# Patient Record
Sex: FEMALE | Race: WHITE | NOT HISPANIC OR LATINO | URBAN - METROPOLITAN AREA
[De-identification: names, ages, dates, MRNs, and addresses within clinical notes are randomized per-mention and may not be internally consistent; named-entity substitution may affect disease eponyms.]

---

## 2018-01-04 ENCOUNTER — EMERGENCY (EMERGENCY)
Facility: HOSPITAL | Age: 28
LOS: 0 days | Discharge: HOME | End: 2018-01-04
Attending: EMERGENCY MEDICINE
Payer: COMMERCIAL

## 2018-01-04 VITALS
TEMPERATURE: 99 F | DIASTOLIC BLOOD PRESSURE: 91 MMHG | OXYGEN SATURATION: 100 % | SYSTOLIC BLOOD PRESSURE: 123 MMHG | RESPIRATION RATE: 17 BRPM | HEIGHT: 62 IN | HEART RATE: 96 BPM | WEIGHT: 139.99 LBS

## 2018-01-04 VITALS
OXYGEN SATURATION: 100 % | DIASTOLIC BLOOD PRESSURE: 85 MMHG | HEART RATE: 91 BPM | TEMPERATURE: 98 F | SYSTOLIC BLOOD PRESSURE: 119 MMHG | RESPIRATION RATE: 16 BRPM

## 2018-01-04 DIAGNOSIS — R30.0 DYSURIA: ICD-10-CM

## 2018-01-04 DIAGNOSIS — R10.9 UNSPECIFIED ABDOMINAL PAIN: ICD-10-CM

## 2018-01-04 DIAGNOSIS — R35.0 FREQUENCY OF MICTURITION: ICD-10-CM

## 2018-01-04 DIAGNOSIS — N39.0 URINARY TRACT INFECTION, SITE NOT SPECIFIED: ICD-10-CM

## 2018-01-04 LAB
APPEARANCE UR: CLEAR — SIGNIFICANT CHANGE UP
BACTERIA # UR AUTO: ABNORMAL
BILIRUB UR-MCNC: NEGATIVE — SIGNIFICANT CHANGE UP
COLOR SPEC: YELLOW — SIGNIFICANT CHANGE UP
DIFF PNL FLD: ABNORMAL
EPI CELLS # UR: SIGNIFICANT CHANGE UP
GLUCOSE UR QL: NEGATIVE MG/DL — SIGNIFICANT CHANGE UP
HCG UR QL: NEGATIVE — SIGNIFICANT CHANGE UP
KETONES UR-MCNC: NEGATIVE — SIGNIFICANT CHANGE UP
LEUKOCYTE ESTERASE UR-ACNC: ABNORMAL
NITRITE UR-MCNC: NEGATIVE — SIGNIFICANT CHANGE UP
PH UR: 6 — SIGNIFICANT CHANGE UP (ref 5–8)
PROT UR-MCNC: 100 MG/DL
SP GR SPEC: 1.01 — SIGNIFICANT CHANGE UP (ref 1.01–1.02)
UROBILINOGEN FLD QL: NEGATIVE MG/DL — SIGNIFICANT CHANGE UP
WBC UR QL: ABNORMAL

## 2018-01-04 PROCEDURE — 99284 EMERGENCY DEPT VISIT MOD MDM: CPT | Mod: 25

## 2018-01-04 RX ORDER — PHENAZOPYRIDINE HCL 100 MG
200 TABLET ORAL ONCE
Qty: 0 | Refills: 0 | Status: COMPLETED | OUTPATIENT
Start: 2018-01-04 | End: 2018-01-04

## 2018-01-04 RX ORDER — NITROFURANTOIN MACROCRYSTAL 50 MG
1 CAPSULE ORAL
Qty: 20 | Refills: 0
Start: 2018-01-04 | End: 2018-01-13

## 2018-01-04 RX ORDER — NITROFURANTOIN MACROCRYSTAL 50 MG
100 CAPSULE ORAL ONCE
Qty: 0 | Refills: 0 | Status: COMPLETED | OUTPATIENT
Start: 2018-01-04 | End: 2018-01-04

## 2018-01-04 RX ORDER — PHENAZOPYRIDINE HCL 100 MG
1 TABLET ORAL
Qty: 6 | Refills: 0
Start: 2018-01-04 | End: 2018-01-05

## 2018-01-04 RX ADMIN — Medication 100 MILLIGRAM(S): at 02:37

## 2018-01-04 RX ADMIN — Medication 200 MILLIGRAM(S): at 02:11

## 2018-01-04 NOTE — ED PROVIDER NOTE - OBJECTIVE STATEMENT
27yoF; with no signif pmh; now p/w dysuria, frequency, urgency, hematuria and suprapubic abd pain. denies n/v/d. denies vaginal discharge. denies f/c/s. denies sick contacts. denies travel.  PMH: denies  SOCIAL: No EtOH/tobacco/illicit substance use  ROS: denies fever, chills, sweats; denies visual changes or eye pain or discharge; denies sore throat, rhinorrhea, tinnitus, ear pain, hearing changes; +abd pain, denies n/v/d; denies cp/palp; denies sob/cough/sputum production; denies back pain, neck pain, muscle aches; + dysuria, hematuria, frequency, urgency; denies headache; denies numbness/tingling/weakness; denies changes in neurologic status/function; denies rashes

## 2018-01-04 NOTE — ED ADULT NURSE NOTE - OBJECTIVE STATEMENT
Pt is an A&OX4 27 YOF who is here for symptoms of a urinary tract infection. Pt states that she has had painful urination, urinary frequency and smelly urine. Pt states that over the last day or two, she has developed these symptoms, which at times have caused her pain that has prevented her from getting comfortable. Pt has no abdominal pain on palpation, + bowel sounds x 4 quadrants and shows no signs of trauma to the area. Pt has a hx of UTI and bladder infection and states that this feels like one of the two. Pt denies any fever, chills.

## 2018-01-09 ENCOUNTER — APPOINTMENT (OUTPATIENT)
Dept: ENDOCRINOLOGY | Facility: CLINIC | Age: 28
End: 2018-01-09
Payer: MEDICAID

## 2018-01-09 VITALS
HEART RATE: 104 BPM | DIASTOLIC BLOOD PRESSURE: 80 MMHG | OXYGEN SATURATION: 98 % | HEIGHT: 62.5 IN | SYSTOLIC BLOOD PRESSURE: 120 MMHG | WEIGHT: 141 LBS | BODY MASS INDEX: 25.3 KG/M2

## 2018-01-09 DIAGNOSIS — F17.210 NICOTINE DEPENDENCE, CIGARETTES, UNCOMPLICATED: ICD-10-CM

## 2018-01-09 DIAGNOSIS — Z81.8 FAMILY HISTORY OF OTHER MENTAL AND BEHAVIORAL DISORDERS: ICD-10-CM

## 2018-01-09 DIAGNOSIS — Z82.49 FAMILY HISTORY OF ISCHEMIC HEART DISEASE AND OTHER DISEASES OF THE CIRCULATORY SYSTEM: ICD-10-CM

## 2018-01-09 DIAGNOSIS — Z78.9 OTHER SPECIFIED HEALTH STATUS: ICD-10-CM

## 2018-01-09 PROCEDURE — 99204 OFFICE O/P NEW MOD 45 MIN: CPT

## 2018-01-09 RX ORDER — VALACYCLOVIR HYDROCHLORIDE 1 G/1
TABLET, FILM COATED ORAL
Refills: 0 | Status: ACTIVE | COMMUNITY

## 2018-01-09 RX ORDER — ALPRAZOLAM 2 MG/1
TABLET ORAL
Refills: 0 | Status: ACTIVE | COMMUNITY

## 2018-01-16 ENCOUNTER — RESULT REVIEW (OUTPATIENT)
Age: 28
End: 2018-01-16

## 2018-01-16 LAB
ESTRADIOL SERPL-MCNC: 30 PG/ML
FSH SERPL-MCNC: 6.3 IU/L
HBA1C MFR BLD HPLC: 4.9 %
HCG SERPL-MCNC: <1 MIU/ML
LH SERPL-ACNC: 14.2 IU/L
PROGEST SERPL-MCNC: 1 NG/ML
PROLACTIN SERPL-MCNC: 22.8 NG/ML
T4 FREE SERPL-MCNC: 1.2 NG/DL
TSH SERPL-ACNC: 2.25 UIU/ML

## 2018-01-18 LAB
17OHP SERPL-MCNC: 2171 NG/DL
ANDROST SERPL-MCNC: 760 NG/DL
MONOMERIC PROLACTIN (ICMA)*: 22 NG/ML
PERCENT MACROPROLACTIN: 0 %
PROLACTIN, SERUM (ICMA)*: 22 NG/ML
TESTOST BND SERPL-MCNC: 19 PG/ML
TESTOST SERPL-MCNC: 72.6 NG/DL

## 2018-08-21 ENCOUNTER — APPOINTMENT (OUTPATIENT)
Dept: DERMATOLOGY | Facility: CLINIC | Age: 28
End: 2018-08-21

## 2019-05-03 ENCOUNTER — RECORD ABSTRACTING (OUTPATIENT)
Age: 29
End: 2019-05-03

## 2019-05-03 ENCOUNTER — NON-APPOINTMENT (OUTPATIENT)
Age: 29
End: 2019-05-03

## 2019-05-03 ENCOUNTER — APPOINTMENT (OUTPATIENT)
Dept: INTERNAL MEDICINE | Facility: CLINIC | Age: 29
End: 2019-05-03
Payer: MEDICAID

## 2019-05-03 VITALS
WEIGHT: 138 LBS | TEMPERATURE: 98.6 F | HEART RATE: 69 BPM | BODY MASS INDEX: 25.4 KG/M2 | HEIGHT: 62 IN | SYSTOLIC BLOOD PRESSURE: 111 MMHG | RESPIRATION RATE: 95 BRPM | DIASTOLIC BLOOD PRESSURE: 71 MMHG

## 2019-05-03 DIAGNOSIS — Z86.19 PERSONAL HISTORY OF OTHER INFECTIOUS AND PARASITIC DISEASES: ICD-10-CM

## 2019-05-03 DIAGNOSIS — Z00.00 ENCOUNTER FOR GENERAL ADULT MEDICAL EXAMINATION W/OUT ABNORMAL FINDINGS: ICD-10-CM

## 2019-05-03 LAB
BILIRUB UR QL STRIP: NEGATIVE
GLUCOSE UR-MCNC: NEGATIVE
HCG UR QL: 0.2 EU/DL
HGB UR QL STRIP.AUTO: NEGATIVE
KETONES UR-MCNC: NEGATIVE
LEUKOCYTE ESTERASE UR QL STRIP: NORMAL
NITRITE UR QL STRIP: NEGATIVE
PH UR STRIP: 7.5
PROT UR STRIP-MCNC: NEGATIVE
SP GR UR STRIP: 1.01

## 2019-05-03 PROCEDURE — 99395 PREV VISIT EST AGE 18-39: CPT | Mod: 25

## 2019-05-03 PROCEDURE — 36415 COLL VENOUS BLD VENIPUNCTURE: CPT

## 2019-05-03 PROCEDURE — 93000 ELECTROCARDIOGRAM COMPLETE: CPT

## 2019-05-03 PROCEDURE — 81003 URINALYSIS AUTO W/O SCOPE: CPT | Mod: QW

## 2019-05-03 RX ORDER — NITROFURANTOIN (MONOHYDRATE/MACROCRYSTALS) 25; 75 MG/1; MG/1
CAPSULE ORAL
Refills: 0 | Status: DISCONTINUED | COMMUNITY
End: 2019-05-03

## 2019-05-03 NOTE — PHYSICAL EXAM
[No Acute Distress] : no acute distress [Well Nourished] : well nourished [Normal Sclera/Conjunctiva] : normal sclera/conjunctiva [Well Developed] : well developed [Well-Appearing] : well-appearing [EOMI] : extraocular movements intact [Normal Outer Ear/Nose] : the outer ears and nose were normal in appearance [PERRL] : pupils equal round and reactive to light [Normal Oropharynx] : the oropharynx was normal [Supple] : supple [No JVD] : no jugular venous distention [No Lymphadenopathy] : no lymphadenopathy [Thyroid Normal, No Nodules] : the thyroid was normal and there were no nodules present [No Respiratory Distress] : no respiratory distress  [Normal Rate] : normal rate  [Clear to Auscultation] : lungs were clear to auscultation bilaterally [No Accessory Muscle Use] : no accessory muscle use [Normal S1, S2] : normal S1 and S2 [Regular Rhythm] : with a regular rhythm [No Abdominal Bruit] : a ~M bruit was not heard ~T in the abdomen [No Murmur] : no murmur heard [No Carotid Bruits] : no carotid bruits [No Edema] : there was no peripheral edema [Pedal Pulses Present] : the pedal pulses are present [No Varicosities] : no varicosities [Soft] : abdomen soft [No Palpable Aorta] : no palpable aorta [No Extremity Clubbing/Cyanosis] : no extremity clubbing/cyanosis [Non Tender] : non-tender [Non-distended] : non-distended [No Masses] : no abdominal mass palpated [Normal Bowel Sounds] : normal bowel sounds [Normal Posterior Cervical Nodes] : no posterior cervical lymphadenopathy [No HSM] : no HSM [No CVA Tenderness] : no CVA  tenderness [No Spinal Tenderness] : no spinal tenderness [Normal Anterior Cervical Nodes] : no anterior cervical lymphadenopathy [No Rash] : no rash [Grossly Normal Strength/Tone] : grossly normal strength/tone [No Joint Swelling] : no joint swelling [Normal Gait] : normal gait [No Focal Deficits] : no focal deficits [Coordination Grossly Intact] : coordination grossly intact [Deep Tendon Reflexes (DTR)] : deep tendon reflexes were 2+ and symmetric [Normal Insight/Judgement] : insight and judgment were intact [Normal Affect] : the affect was normal

## 2019-05-03 NOTE — HEALTH RISK ASSESSMENT
[Excellent] : ~his/her~  mood as  excellent [Student] : student [College] : College [] : No [de-identified] : good

## 2019-05-03 NOTE — HISTORY OF PRESENT ILLNESS
[FreeTextEntry1] : For comprehensive [de-identified] : School   [employer]   to graduate   education major\par \par Home:\par Son  5 yo\par M  Breast cancer  gene -\par F  Arrhythmia issues \par \par Recently started OCs  \par Care by gyn  up to date\par Recent irreg menses\par Not pregnant  pregnant\par Recently   one partner\par \par HSV +    using Valtrex prn\par \par Recent STD eval   neg

## 2019-05-04 LAB
25(OH)D3 SERPL-MCNC: 24.2 NG/ML
ALBUMIN SERPL ELPH-MCNC: 4.9 G/DL
ALP BLD-CCNC: 59 U/L
ALT SERPL-CCNC: 20 U/L
ANION GAP SERPL CALC-SCNC: 14 MMOL/L
AST SERPL-CCNC: 15 U/L
BASOPHILS # BLD AUTO: 0.01 K/UL
BASOPHILS NFR BLD AUTO: 0.1 %
BILIRUB SERPL-MCNC: 0.3 MG/DL
BUN SERPL-MCNC: 10 MG/DL
CALCIUM SERPL-MCNC: 9.9 MG/DL
CHLORIDE SERPL-SCNC: 101 MMOL/L
CHOLEST SERPL-MCNC: 195 MG/DL
CHOLEST/HDLC SERPL: 3.2 RATIO
CO2 SERPL-SCNC: 26 MMOL/L
CREAT SERPL-MCNC: 0.74 MG/DL
EOSINOPHIL # BLD AUTO: 0.07 K/UL
EOSINOPHIL NFR BLD AUTO: 0.9 %
ESTIMATED AVERAGE GLUCOSE: 103 MG/DL
GLUCOSE SERPL-MCNC: 114 MG/DL
HBA1C MFR BLD HPLC: 5.2 %
HCT VFR BLD CALC: 43.2 %
HDLC SERPL-MCNC: 61 MG/DL
HGB BLD-MCNC: 13.8 G/DL
IMM GRANULOCYTES NFR BLD AUTO: 0.4 %
LDLC SERPL CALC-MCNC: 108 MG/DL
LYMPHOCYTES # BLD AUTO: 1.51 K/UL
LYMPHOCYTES NFR BLD AUTO: 20.4 %
MAN DIFF?: NORMAL
MCHC RBC-ENTMCNC: 28.9 PG
MCHC RBC-ENTMCNC: 31.9 GM/DL
MCV RBC AUTO: 90.4 FL
MONOCYTES # BLD AUTO: 0.36 K/UL
MONOCYTES NFR BLD AUTO: 4.9 %
NEUTROPHILS # BLD AUTO: 5.44 K/UL
NEUTROPHILS NFR BLD AUTO: 73.3 %
PLATELET # BLD AUTO: 275 K/UL
POTASSIUM SERPL-SCNC: 4 MMOL/L
PROT SERPL-MCNC: 7.9 G/DL
RBC # BLD: 4.78 M/UL
RBC # FLD: 12.5 %
SODIUM SERPL-SCNC: 140 MMOL/L
T4 FREE SERPL-MCNC: 1.2 NG/DL
TRIGL SERPL-MCNC: 129 MG/DL
TSH SERPL-ACNC: 1.59 UIU/ML
WBC # FLD AUTO: 7.42 K/UL

## 2019-07-29 ENCOUNTER — APPOINTMENT (OUTPATIENT)
Dept: ENDOCRINOLOGY | Facility: CLINIC | Age: 29
End: 2019-07-29
Payer: MEDICAID

## 2019-07-29 VITALS
BODY MASS INDEX: 25.21 KG/M2 | OXYGEN SATURATION: 98 % | DIASTOLIC BLOOD PRESSURE: 70 MMHG | SYSTOLIC BLOOD PRESSURE: 110 MMHG | HEIGHT: 62 IN | WEIGHT: 137 LBS | HEART RATE: 77 BPM

## 2019-07-29 DIAGNOSIS — Z80.3 FAMILY HISTORY OF MALIGNANT NEOPLASM OF BREAST: ICD-10-CM

## 2019-07-29 PROCEDURE — 99214 OFFICE O/P EST MOD 30 MIN: CPT

## 2019-07-29 NOTE — REVIEW OF SYSTEMS
[Decreased Appetite] : appetite not decreased [Fatigue] : no fatigue [Recent Weight Gain (___ Lbs)] : recent [unfilled] ~Ulb weight gain [Dysphonia] : no dysphonia [Dysphagia] : no dysphagia [Chest Pain] : no chest pain [Palpitations] : no palpitations [Heart Rate Is Fast] : the heart rate was not fast [Heart Rate Is Slow] : the heart rate was not slow [Irregular Menses] : irregular menses [Joint Pain] : no joint pain [Joint Stiffness] : no joint stiffness [Hirsutism] : hirsutism [Acanthosis] : no acanthosis  [Acne] : acne [Cold Intolerance] : cold tolerant [Heat Intolerance] : heat tolerant [Easy Bleeding] : no ~M tendency for easy bleeding [Easy Bruising] : no tendency for easy bruising [Negative] : ENT [All other systems negative] : All other systems negative

## 2019-07-29 NOTE — ASSESSMENT
[FreeTextEntry1] : Patient is a 28 yo woman with non-classic CAH and galactorrhea\par \par 1. Non-classic CAH\par -patient's main symptoms include acne and hirsutism.  She has had trials of OCPs several times in the past but cannot tolerate it either due to recurrent fungal infections or emotional lability. Has also attempted IUDs of which both got imbedded and required removal\par -we discussed that laser therapy might be the best way to address hirsutism but she cannot afford it at this time\par -other management options include spironolactone and hydrocortisone.  I discussed the side effects of spironolactone including K abnormalities, hypotension and FETAL/TERATOGENIC defects.  For hydrocortisone discussed weight gain, cortisol excess\par -patient will think about the options \par -for now, repeat labs\par \par 2. Galactorrhea\par -periods are irregular, check HcG and PRL levels once again\par -it seems to have decreased a bit but remains\par \par For now, follow up labs and consider treatment options based on shared-decision making

## 2019-07-29 NOTE — HISTORY OF PRESENT ILLNESS
[FreeTextEntry1] : Patient is a 28 yo woman with a history of CAH\par \par CAH was diagnosed at age 5 years old. Patient started to have hair growth, breast development came early, growth was fast. At that time, she had signs of androgenization. She was on a "daily medication" until 9 or 10 years old and then she came off of her medication. Patient was evaluated by an endocrine doctor but was a loss to follow up. Patient feels like in the past year, hair growth is coming back. Patient is not positive but believes both parents were "carriers" of CAH. Last year, saw a gynecologist for regular evaluation. \par Menarche at 10-12 year old, was on birth control from 17 years old until 20 years for a diagnosis of ovarian cysts. Patient came off of birth control pill due to chronic yeast/bacterial infections so she just stopped the OCP. Then one year after stopping OCP she got pregnant. Pregnancy was "good." Has breast milk discharge. No nipple stimulation. No visual field cuts. Not on any anti-psychotic agents, just Xanax as needed for anxiety and depression. No history of vaginoplasty, no ambiguous genitalia history. Hair growth is mainly to the chin area-shaves once every two days, cannot afford laser therapy at this time. Acne is also bothersome for the patient\par Sexually active, no protection. \par LMP: 1/8/18\par Tried OCPs in past, several times, but had infections.  IUD x 2 got imbedded. Currently utilizing condoms\par Last and only visit was in 2017.  Lab work confirmed CAH and we discussed options for treatment including OCP and spironolactone for hirsutism

## 2019-07-29 NOTE — PHYSICAL EXAM
[Alert] : alert [No Acute Distress] : no acute distress [Well Developed] : well developed [Well Nourished] : well nourished [EOMI] : extra ocular movement intact [Normal Hearing] : hearing was normal [No Proptosis] : no proptosis [No Respiratory Distress] : no respiratory distress [Normal Rate and Effort] : normal respiratory rhythm and effort [No Accessory Muscle Use] : no accessory muscle use [Clear to Auscultation] : lungs were clear to auscultation bilaterally [Normal S1, S2] : normal S1 and S2 [Normal Rate] : heart rate was normal  [Normal Bowel Sounds] : normal bowel sounds [Not Tender] : non-tender [Regular Rhythm] : with a regular rhythm [Soft] : abdomen soft [No Stigmata of Cushings Syndrome] : no stigmata of cushings syndrome [Normal Strength/Tone] : muscle strength and tone were normal [Normal Gait] : normal gait [No Rash] : no rash [Acne] : acne [Hirsutism] : hirsutism [Normal Reflexes] : deep tendon reflexes were 2+ and symmetric [No Motor Deficits] : the motor exam was normal [Normal Insight/Judgement] : insight and judgment were intact [No Tremors] : no tremors [Normal Affect] : the affect was normal [Normal Mood] : the mood was normal [de-identified] : + Galactorrhea

## 2019-08-01 LAB
CHOLEST SERPL-MCNC: 168 MG/DL
CHOLEST/HDLC SERPL: 2.9 RATIO
DHEA-S SERPL-MCNC: 520 UG/DL
ESTIMATED AVERAGE GLUCOSE: 97 MG/DL
ESTRADIOL SERPL-MCNC: 156 PG/ML
FSH SERPL-MCNC: 2.8 IU/L
HBA1C MFR BLD HPLC: 5 %
HCG SERPL-MCNC: <1 MIU/ML
HDLC SERPL-MCNC: 59 MG/DL
LDLC SERPL CALC-MCNC: 93 MG/DL
LH SERPL-ACNC: 12.4 IU/L
PROLACTIN SERPL-MCNC: 40.5 NG/ML
T4 FREE SERPL-MCNC: 1.3 NG/DL
TRIGL SERPL-MCNC: 78 MG/DL
TSH SERPL-ACNC: 3.4 UIU/ML

## 2019-08-08 LAB
17OHP SERPL-MCNC: 3687 NG/DL
ANDROST SERPL-MCNC: 1040 NG/DL

## 2019-08-12 ENCOUNTER — FORM ENCOUNTER (OUTPATIENT)
Age: 29
End: 2019-08-12

## 2019-08-13 ENCOUNTER — APPOINTMENT (OUTPATIENT)
Dept: CT IMAGING | Facility: IMAGING CENTER | Age: 29
End: 2019-08-13
Payer: MEDICAID

## 2019-08-13 ENCOUNTER — OUTPATIENT (OUTPATIENT)
Dept: OUTPATIENT SERVICES | Facility: HOSPITAL | Age: 29
LOS: 1 days | End: 2019-08-13
Payer: MEDICAID

## 2019-08-13 DIAGNOSIS — L68.0 HIRSUTISM: ICD-10-CM

## 2019-08-13 DIAGNOSIS — E25.0 CONGENITAL ADRENOGENITAL DISORDERS ASSOCIATED WITH ENZYME DEFICIENCY: ICD-10-CM

## 2019-08-13 PROCEDURE — 74170 CT ABD WO CNTRST FLWD CNTRST: CPT

## 2019-08-13 PROCEDURE — 74170 CT ABD WO CNTRST FLWD CNTRST: CPT | Mod: 26

## 2019-08-16 ENCOUNTER — FORM ENCOUNTER (OUTPATIENT)
Age: 29
End: 2019-08-16

## 2019-08-16 ENCOUNTER — RESULT CHARGE (OUTPATIENT)
Age: 29
End: 2019-08-16

## 2019-08-16 ENCOUNTER — CLINICAL ADVICE (OUTPATIENT)
Age: 29
End: 2019-08-16

## 2019-08-17 ENCOUNTER — APPOINTMENT (OUTPATIENT)
Dept: MRI IMAGING | Facility: IMAGING CENTER | Age: 29
End: 2019-08-17

## 2019-08-17 ENCOUNTER — OUTPATIENT (OUTPATIENT)
Dept: OUTPATIENT SERVICES | Facility: HOSPITAL | Age: 29
LOS: 1 days | End: 2019-08-17
Payer: MEDICAID

## 2019-08-17 DIAGNOSIS — E22.1 HYPERPROLACTINEMIA: ICD-10-CM

## 2019-08-17 PROCEDURE — 70553 MRI BRAIN STEM W/O & W/DYE: CPT | Mod: 26

## 2019-08-17 PROCEDURE — A9585: CPT

## 2019-08-17 PROCEDURE — 70553 MRI BRAIN STEM W/O & W/DYE: CPT

## 2019-10-23 ENCOUNTER — APPOINTMENT (OUTPATIENT)
Dept: INTERNAL MEDICINE | Facility: CLINIC | Age: 29
End: 2019-10-23
Payer: MEDICAID

## 2019-10-23 ENCOUNTER — TRANSCRIPTION ENCOUNTER (OUTPATIENT)
Age: 29
End: 2019-10-23

## 2019-10-23 VITALS
TEMPERATURE: 98.9 F | HEIGHT: 62 IN | DIASTOLIC BLOOD PRESSURE: 74 MMHG | OXYGEN SATURATION: 100 % | BODY MASS INDEX: 25.76 KG/M2 | RESPIRATION RATE: 16 BRPM | HEART RATE: 67 BPM | SYSTOLIC BLOOD PRESSURE: 120 MMHG | WEIGHT: 140 LBS

## 2019-10-23 PROCEDURE — 99213 OFFICE O/P EST LOW 20 MIN: CPT

## 2019-10-23 NOTE — PHYSICAL EXAM
[RLQ] : in the right lower quadrant [Firm] : firm [LLQ] : in the left lower quadrant [No Mass] : no masses were palpated [No HSM] : no hepatosplenomegaly noted [None] : no CVA tenderness [Rigid] : not rigid [Rebound] : no rebound [Guarding] : no guarding

## 2019-10-23 NOTE — HISTORY OF PRESENT ILLNESS
[FreeTextEntry8] : Stomach bloating\par Cramps / pain\par Tried Gas-X\par Worse with lactose\par "Lower abd pain"  \par "Shooting pains" in rectum\par Gene + for colon cancer  (recently tested) \par

## 2019-10-29 ENCOUNTER — CHART COPY (OUTPATIENT)
Age: 29
End: 2019-10-29

## 2019-11-26 ENCOUNTER — OUTPATIENT (OUTPATIENT)
Dept: OUTPATIENT SERVICES | Facility: HOSPITAL | Age: 29
LOS: 1 days | Discharge: HOME | End: 2019-11-26

## 2019-11-26 ENCOUNTER — APPOINTMENT (OUTPATIENT)
Dept: INTERNAL MEDICINE | Facility: HOSPITAL | Age: 29
End: 2019-11-26
Payer: MEDICAID

## 2019-11-26 VITALS
HEART RATE: 56 BPM | RESPIRATION RATE: 13 BRPM | SYSTOLIC BLOOD PRESSURE: 99 MMHG | DIASTOLIC BLOOD PRESSURE: 58 MMHG | OXYGEN SATURATION: 99 %

## 2019-11-26 VITALS
SYSTOLIC BLOOD PRESSURE: 138 MMHG | HEIGHT: 62 IN | DIASTOLIC BLOOD PRESSURE: 75 MMHG | TEMPERATURE: 98 F | RESPIRATION RATE: 13 BRPM | HEART RATE: 80 BPM | WEIGHT: 139.99 LBS

## 2019-11-26 DIAGNOSIS — R10.9 UNSPECIFIED ABDOMINAL PAIN: ICD-10-CM

## 2019-11-26 LAB — HCG UR QL: NEGATIVE — SIGNIFICANT CHANGE UP

## 2019-11-26 PROCEDURE — 45378 DIAGNOSTIC COLONOSCOPY: CPT

## 2019-11-26 RX ORDER — SODIUM CHLORIDE 9 MG/ML
1000 INJECTION, SOLUTION INTRAVENOUS
Refills: 0 | Status: DISCONTINUED | OUTPATIENT
Start: 2019-11-26 | End: 2019-12-17

## 2019-11-26 RX ADMIN — SODIUM CHLORIDE 30 MILLILITER(S): 9 INJECTION, SOLUTION INTRAVENOUS at 07:55

## 2020-02-04 ENCOUNTER — APPOINTMENT (OUTPATIENT)
Dept: ENDOCRINOLOGY | Facility: CLINIC | Age: 30
End: 2020-02-04
Payer: MEDICAID

## 2020-02-04 VITALS
WEIGHT: 145 LBS | SYSTOLIC BLOOD PRESSURE: 119 MMHG | HEART RATE: 68 BPM | OXYGEN SATURATION: 98 % | BODY MASS INDEX: 26.68 KG/M2 | DIASTOLIC BLOOD PRESSURE: 60 MMHG | HEIGHT: 62 IN

## 2020-02-04 PROCEDURE — 99214 OFFICE O/P EST MOD 30 MIN: CPT

## 2020-02-04 NOTE — HISTORY OF PRESENT ILLNESS
[FreeTextEntry1] : Patient is a 28 yo woman here for follow up of non-classic CAH and hyperprolactinemia\par \par CAH was diagnosed at age 5 years old. Patient started to have hair growth, breast development came early, growth was fast. At that time, she had signs of androgenization. She was on a "daily medication" until 9 or 10 years old and then she came off of her medication. Patient was evaluated by an endocrine doctor but was a loss to follow up. \par Menarche at 10-12 year old, was on birth control from 17 years old until 20 years for a diagnosis of ovarian cysts. Patient came off of birth control pill due to chronic yeast/bacterial infections so she just stopped the OCP. Then one year after stopping OCP she got pregnant.  No history of vaginoplasty, no ambiguous genitalia history. Hair growth is mainly to the chin area-shaves once every two days, cannot afford laser therapy at this time. Acne is also bothersome for the patient\par Tried OCPs in past, several times, but had infections. IUD x 2 got imbedded. Currently utilizing condoms.  She attempted birth control again but developed infections so declines further birth control pills\par Periods remain regular\par \par Has breast milk discharge. No nipple stimulation. No visual field cuts. Not on any anti-psychotic agents, just Xanax as needed for anxiety and depression. \par Pituitary MRI August 2019 did NOT reveal pituitary pathology, no pituitary adenoma identified\par

## 2020-02-04 NOTE — ASSESSMENT
[FreeTextEntry1] : Patient is a 28 yo woman with NCAH, hirsutism and hyperprolactinemia\par \par 1. NCAH\par -repeat 17 OHP, androstenedione and DHEAS levels\par -her main symptoms are hirsutism and inability to maintain stable weight\par -check salivary cortisol levels as well\par -she has tried multiple contraceptive methods including OCP and IUD but has not been able to tolerate these methods due to recurrent fungal/yeast infections\par -symptomatic treatment includes spironolactone with the understanding that contraception will be required (i.e. barrier as there can be teratogenic effects), other options include anti-andorgens (i.e. finasteride) or low dose hydrocortisone\par \par 2. Hyperprolactinemia\par -repeat PRL and macroprolactin levels\par -galactorrhea remains\par -pituitary MRI did not reveal an adenoma\par -check HCG\par \par 3. Hirsutism\par -based on results can consider anti-androgens to alleviate hair growth\par \par Follow up in 6 months, sooner based on labs/symptoms

## 2020-02-04 NOTE — REVIEW OF SYSTEMS
[Decreased Appetite] : ~L decreased appetite [Depression] : depression [Acanthosis] : acanthosis [As Noted in HPI] : as noted in HPI [Anxiety] : anxiety [Fatigue] : no fatigue [Chest Pain] : no chest pain [Palpitations] : no palpitations [Wheezing] : no wheezing was heard [Cough] : no cough [SOB on Exertion] : no shortness of breath during exertion [Nausea] : no nausea [Vomiting] : no vomiting was observed [Constipation] : no constipation [Diarrhea] : no diarrhea [Irregular Menses] : regular menses [Galactorrhea] : galactorrhea  [Cold Intolerance] : cold tolerant [Heat Intolerance] : heat tolerant

## 2020-02-04 NOTE — PHYSICAL EXAM
[Alert] : alert [No Acute Distress] : no acute distress [Well Nourished] : well nourished [Well Developed] : well developed [EOMI] : extra ocular movement intact [No Proptosis] : no proptosis [Thyroid Not Enlarged] : the thyroid was not enlarged [Normal Hearing] : hearing was normal [Normal Rate and Effort] : normal respiratory rhythm and effort [No Accessory Muscle Use] : no accessory muscle use [No Respiratory Distress] : no respiratory distress [Clear to Auscultation] : lungs were clear to auscultation bilaterally [Normal Rate] : heart rate was normal  [Normal S1, S2] : normal S1 and S2 [Normal Bowel Sounds] : normal bowel sounds [Regular Rhythm] : with a regular rhythm [Not Distended] : not distended [No Stigmata of Cushings Syndrome] : no stigmata of cushings syndrome [Not Tender] : non-tender [Normal Gait] : normal gait [No Joint Swelling] : no joint swelling seen [Normal Strength/Tone] : muscle strength and tone were normal [Acne] : acne [Hirsutism] : hirsutism [No Motor Deficits] : the motor exam was normal [Normal Insight/Judgement] : insight and judgment were intact [Normal Affect] : the affect was normal [Normal Mood] : the mood was normal

## 2020-02-06 LAB
DHEA-S SERPL-MCNC: 524 UG/DL
ESTIMATED AVERAGE GLUCOSE: 97 MG/DL
ESTRADIOL SERPL-MCNC: 35 PG/ML
FSH SERPL-MCNC: 5.8 IU/L
HBA1C MFR BLD HPLC: 5 %
HCG SERPL-MCNC: <1 MIU/ML
LH SERPL-ACNC: 16.2 IU/L
PROLACTIN SERPL-MCNC: 10.7 NG/ML
T4 FREE SERPL-MCNC: 1.3 NG/DL
TESTOST SERPL-MCNC: 69.6 NG/DL
TSH SERPL-ACNC: 1.15 UIU/ML

## 2020-02-11 ENCOUNTER — TRANSCRIPTION ENCOUNTER (OUTPATIENT)
Age: 30
End: 2020-02-11

## 2020-02-11 LAB
17OHP SERPL-MCNC: 336 NG/DL
ANDROST SERPL-MCNC: 465 NG/DL

## 2020-02-12 ENCOUNTER — TRANSCRIPTION ENCOUNTER (OUTPATIENT)
Age: 30
End: 2020-02-12

## 2020-02-12 LAB
CORTIS SAL-MCNC: NORMAL
MONOMERIC PROLACTIN (ICMA)*: 8.8 NG/ML
PERCENT MACROPROLACTIN: 8 %
PROLACTIN, SERUM (ICMA)*: 9.6 NG/ML

## 2020-03-04 ENCOUNTER — TRANSCRIPTION ENCOUNTER (OUTPATIENT)
Age: 30
End: 2020-03-04

## 2020-08-04 ENCOUNTER — APPOINTMENT (OUTPATIENT)
Dept: ENDOCRINOLOGY | Facility: CLINIC | Age: 30
End: 2020-08-04
Payer: MEDICAID

## 2020-08-04 VITALS
HEIGHT: 62 IN | OXYGEN SATURATION: 98 % | DIASTOLIC BLOOD PRESSURE: 80 MMHG | HEART RATE: 105 BPM | SYSTOLIC BLOOD PRESSURE: 120 MMHG | TEMPERATURE: 98.5 F | BODY MASS INDEX: 24.48 KG/M2 | WEIGHT: 133 LBS

## 2020-08-04 PROCEDURE — 99214 OFFICE O/P EST MOD 30 MIN: CPT

## 2020-08-04 NOTE — REVIEW OF SYSTEMS
[Fatigue] : no fatigue [Decreased Appetite] : appetite not decreased [Recent Weight Loss (___ Lbs)] : recent weight loss: [unfilled] lbs [Chest Pain] : no chest pain [Slow Heart Rate] : heart rate is not slow [Palpitations] : no palpitations [Shortness Of Breath] : no shortness of breath [Nausea] : no nausea [Constipation] : no constipation [Vomiting] : no vomiting [Diarrhea] : no diarrhea [Gas/Bloating] : gas/bloating [Irregular Menses] : regular menses [Hirsutism] : hirsutism [Acne] : acne [Headaches] : no headaches [Tremors] : no tremors

## 2020-08-04 NOTE — PHYSICAL EXAM
[Well Nourished] : well nourished [Alert] : alert [Healthy Appearance] : healthy appearance [No Respiratory Distress] : no respiratory distress [EOMI] : extra ocular movement intact [No Accessory Muscle Use] : no accessory muscle use [Normal Rate and Effort] : normal respiratory rate and effort [Normal Rate] : heart rate was normal [No Stigmata of Cushings Syndrome] : no stigmata of Cushings Syndrome [Normal Gait] : normal gait [Hirsutism] : hirsutism present [No Motor Deficits] : the motor exam was normal [Normal Affect] : the affect was normal [Normal Mood] : the mood was normal

## 2020-08-04 NOTE — ASSESSMENT
[FreeTextEntry1] : Patient is a 29 yo woman with NCCAH and hyperprolactinemia\par \par 1. NCCAH\par -patient with no interval symptoms. Her main symptoms have been acne and hirsutism.  Discussed options including steroids, spironolactone and finasteride but she has held off until recently. At this point, the shaving of hair is bothersome and she would like to consider medicines\par -patient is going to schedule second opinion at [place].  She will determine who she wants to continue care with\par -requires GYN visit\par -recommend bone density for baseline\par -repeat DHEAS, testosterone and androstenedione levels today at the patient's request.  Educated that serial labs not necessarily required but she prefers to have these values to track her progress\par -discussed that if there are plans for conception she will have to seek specialty reproductive endocrine care \par -adrenal imaging in 2019 revealed no abnormal adrenal pathology\par -fertility and genetic counseling as needed\par \par 2. Hyperprolactinemia\par -repeat prolactin and macroprolactin levels\par -check Hcg\par -no pituitary adenoma on imaging; surveillance imaging if prolactin levels increase\par \par 3. Hirsutism due to NCCAH\par -options discussed include steroids, spironolactone and finasteride. She will consider further\par -for cosmetic improvement, hair removal also an option\par \par Follow up with Dr. Chang in 3 months if patient continues to receive care through [facility].  Contact for medical records if transferring to [place]. Patient made aware that I am leaving the practice.

## 2020-08-04 NOTE — HISTORY OF PRESENT ILLNESS
[FreeTextEntry1] : Patient is a 31 yo woman here for follow up of non-classic CAH and hyperprolactinemia\par \par CAH was diagnosed at age 5 years old. Patient started to have hair growth, breast development came early, growth was fast. At that time, she had signs of androgenization. She was on a "daily medication" until 9 or 10 years old and then she came off of her medication. Patient was evaluated by an endocrine doctor but was a loss to follow up. \par Menarche at 10-12 year old, was on birth control from 17 years old until 20 years for a diagnosis of ovarian cysts. Patient came off of birth control pill due to chronic yeast/bacterial infections so she just stopped the OCP. Then one year after stopping OCP she got pregnant. No history of vaginoplasty, no ambiguous genitalia history. \par Patient LMP 8/3/2020\par Tried OCPs in past, several times, but had infections. IUD x 2 got imbedded. Currently utilizing condoms. She attempted birth control again but developed infections so declines further birth control pills\par Patient had lost 12 pounds with diet and exercise.\par She has cramps and bloating prior to period\par \par Has breast milk discharge. No nipple stimulation. No visual field cuts. Not on any anti-psychotic agents, just Xanax as needed for anxiety and depression. Left nipple discharge is less than usual.  Has not had breast exam and would like one.\par Pituitary MRI August 2019 did NOT reveal pituitary pathology, no pituitary adenoma identified\par \par February 2020 labs revealed negative salivary cortisol and normal prolactin levels, DHEAS 524, improved androstenedione levels and 17 OHP in normative ranges.\par

## 2020-08-05 LAB
DHEA-S SERPL-MCNC: 609 UG/DL
ESTRADIOL SERPL-MCNC: 26 PG/ML
FSH SERPL-MCNC: 4.9 IU/L
HCG SERPL-MCNC: <1 MIU/ML
LH SERPL-ACNC: 9.2 IU/L
PROLACTIN SERPL-MCNC: 23.3 NG/ML
TESTOST SERPL-MCNC: 102 NG/DL
TSH SERPL-ACNC: 2.31 UIU/ML

## 2020-08-10 ENCOUNTER — TRANSCRIPTION ENCOUNTER (OUTPATIENT)
Age: 30
End: 2020-08-10

## 2020-08-10 LAB
17OHP SERPL-MCNC: 1738 NG/DL
ANDROST SERPL-MCNC: 1030 NG/DL

## 2020-08-17 LAB
MONOMERIC PROLACTIN (ICMA)*: 18 NG/ML
PERCENT MACROPROLACTIN: 14 %
PROLACTIN, SERUM (ICMA)*: 21 NG/ML

## 2020-09-08 ENCOUNTER — APPOINTMENT (OUTPATIENT)
Dept: INTERNAL MEDICINE | Facility: CLINIC | Age: 30
End: 2020-09-08
Payer: MEDICAID

## 2020-09-08 VITALS
SYSTOLIC BLOOD PRESSURE: 126 MMHG | HEART RATE: 96 BPM | HEIGHT: 62 IN | OXYGEN SATURATION: 98 % | DIASTOLIC BLOOD PRESSURE: 73 MMHG | WEIGHT: 135 LBS | BODY MASS INDEX: 24.84 KG/M2

## 2020-09-08 PROCEDURE — 99213 OFFICE O/P EST LOW 20 MIN: CPT

## 2020-09-08 PROCEDURE — 99203 OFFICE O/P NEW LOW 30 MIN: CPT

## 2020-09-08 RX ORDER — NORGESTIMATE AND ETHINYL ESTRADIOL 7DAYSX3 LO
0.18/0.215/0.25 KIT ORAL DAILY
Refills: 0 | Status: DISCONTINUED | COMMUNITY
Start: 2019-05-03 | End: 2020-09-08

## 2020-09-08 RX ORDER — SODIUM PICOSULFATE, MAGNESIUM OXIDE, AND ANHYDROUS CITRIC ACID 10; 3.5; 12 MG/160ML; G/160ML; G/160ML
10-3.5-12 MG-GM LIQUID ORAL
Qty: 1 | Refills: 0 | Status: DISCONTINUED | COMMUNITY
Start: 2019-10-29 | End: 2020-09-08

## 2020-09-08 NOTE — HISTORY OF PRESENT ILLNESS
[FreeTextEntry1] : feels she has an external hemorrhoid- difficulty having BM. Has been using OTC meds. . some bleeding and spasms in her rectum lasting a few seconds.

## 2020-09-08 NOTE — PHYSICAL EXAM
[General Appearance - Alert] : alert [General Appearance - In No Acute Distress] : in no acute distress [Sclera] : the sclera and conjunctiva were normal [PERRL With Normal Accommodation] : pupils were equal in size, round, and reactive to light [Extraocular Movements] : extraocular movements were intact [Outer Ear] : the ears and nose were normal in appearance [Oropharynx] : the oropharynx was normal [Neck Appearance] : the appearance of the neck was normal [Neck Cervical Mass (___cm)] : no neck mass was observed [Thyroid Nodule] : there were no palpable thyroid nodules [Jugular Venous Distention Increased] : there was no jugular-venous distention [Thyroid Diffuse Enlargement] : the thyroid was not enlarged [Auscultation Breath Sounds / Voice Sounds] : lungs were clear to auscultation bilaterally [Heart Sounds Gallop] : no gallops [Heart Sounds] : normal S1 and S2 [Heart Rate And Rhythm] : heart rate was normal and rhythm regular [Murmurs] : no murmurs [Heart Sounds Pericardial Friction Rub] : no pericardial rub [Bowel Sounds] : normal bowel sounds [Abdomen Soft] : soft [] : no hepato-splenomegaly [Abdomen Tenderness] : non-tender [Internal Hemorrhoid] : internal hemorrhoids [Abdomen Mass (___ Cm)] : no abdominal mass palpated [FreeTextEntry1] : small [External Hemorrhoid] : external hemorrhoids [External Female Genitalia] : normal external genitalia [Urethral Meatus] : normal urethra [Urinary Bladder Findings] : the bladder was normal on palpation [Cervical Lymph Nodes Enlarged Posterior Bilaterally] : posterior cervical [Cervical Lymph Nodes Enlarged Anterior Bilaterally] : anterior cervical [Axillary Lymph Nodes Enlarged Bilaterally] : axillary [Supraclavicular Lymph Nodes Enlarged Bilaterally] : supraclavicular [Femoral Lymph Nodes Enlarged Bilaterally] : femoral [No CVA Tenderness] : no ~M costovertebral angle tenderness [Inguinal Lymph Nodes Enlarged Bilaterally] : inguinal [No Spinal Tenderness] : no spinal tenderness [Abnormal Walk] : normal gait [Nail Clubbing] : no clubbing  or cyanosis of the fingernails [Musculoskeletal - Swelling] : no joint swelling seen [Motor Tone] : muscle strength and tone were normal [Skin Color & Pigmentation] : normal skin color and pigmentation [Deep Tendon Reflexes (DTR)] : deep tendon reflexes were 2+ and symmetric [Sensation] : the sensory exam was normal to light touch and pinprick [No Focal Deficits] : no focal deficits [Impaired Insight] : insight and judgment were intact [Oriented To Time, Place, And Person] : oriented to person, place, and time [Affect] : the affect was normal

## 2020-11-05 ENCOUNTER — APPOINTMENT (OUTPATIENT)
Dept: ENDOCRINOLOGY | Facility: CLINIC | Age: 30
End: 2020-11-05
Payer: MEDICAID

## 2020-11-05 VITALS
HEIGHT: 62 IN | DIASTOLIC BLOOD PRESSURE: 74 MMHG | WEIGHT: 133 LBS | BODY MASS INDEX: 24.48 KG/M2 | SYSTOLIC BLOOD PRESSURE: 120 MMHG | OXYGEN SATURATION: 99 % | TEMPERATURE: 99 F | HEART RATE: 107 BPM

## 2020-11-05 PROCEDURE — 99072 ADDL SUPL MATRL&STAF TM PHE: CPT

## 2020-11-05 PROCEDURE — 99213 OFFICE O/P EST LOW 20 MIN: CPT | Mod: 25

## 2020-11-05 RX ORDER — HYDROCORTISONE 25 MG/G
2.5 CREAM TOPICAL
Qty: 1 | Refills: 0 | Status: DISCONTINUED | COMMUNITY
Start: 2020-09-08 | End: 2020-11-05

## 2020-11-05 NOTE — PHYSICAL EXAM
[Alert] : alert [No Acute Distress] : no acute distress [Normal Sclera/Conjunctiva] : normal sclera/conjunctiva [No Respiratory Distress] : no respiratory distress [Normal Rate and Effort] : normal respiratory rate and effort [Clear to Auscultation] : lungs were clear to auscultation bilaterally [Normal S1, S2] : normal S1 and S2 [No Murmurs] : no murmurs [Normal Rate] : heart rate was normal [Regular Rhythm] : with a regular rhythm [Normal Bowel Sounds] : normal bowel sounds [Not Tender] : non-tender [Soft] : abdomen soft [de-identified] : +acne along jawline and perioral area

## 2020-11-05 NOTE — HISTORY OF PRESENT ILLNESS
[facility] Dosing Services: 6/24/17    Consult for Warfarin Dosing by Pharmacy by Dr. Maria Elena Ac provided for this 51 y.o.  male , for indication of Atrial Fibrillation. New Start:  Day 1  Dose to achieve an INR goal of 2-3    Order entered for  Warfarin  7.5 (mg) ordered to be given today at 18:00. Significant drug interactions: Lovenox bridge  Previous dose given None   PT/INR Lab Results   Component Value Date/Time    INR 1.1 06/22/2017 12:54 PM      Platelets Lab Results   Component Value Date/Time    PLATELET 052 27/24/1929 12:54 PM      H/H Lab Results   Component Value Date/Time    HGB 14.0 06/22/2017 12:54 PM        Pharmacy to follow daily and will provide subsequent Warfarin dosing based on clinical status. SAMIA LukeBANKELLIOTT Vallecilloso Evan Tamara  information [phone] [FreeTextEntry1] : 29 yo with hx of NCAH and  here for f/u . She has a hx of hyperPRl s/p pregnancy of her son 7 years ago. She only  him for 2-3 months but continued to make milk. She only has it if her nipples are squeezed. Last level was normal. \par She is intolerant of OCPs due to yeast infections or BV. She has also tried an IUD but it was embedded x 2 as well as shaheen-ring which caused vaginitis. She was scared to try the norplant and never was offered a patch. \par She complains of face and arm acne, as well as hirsutism of face and chin. She has never tried laser. 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 She has a hx of depression and anxiety since age 17. She denies any S/Hi. She has issues with insomnia. She only uses her xanax 2x/month. She has a therapist that she can see. \par \par GYN: DESHAUN Schmidt at [facility]\par \par Her sister is 31 and has NCAH and was recently diagnosed wit a pituitary cyst.

## 2020-11-05 NOTE — ASSESSMENT
[FreeTextEntry1] : 31 yo with hx of NCAH and  here for f/u . She has a hx of hyperPRL s/p pregnancy of her son 7 years ago. \par 1) NCAH: pt with acne and hirsutism due to elevated androstenedione and testosterone. She cannot tolerate OCPs so we discussed using a small dose of steroids vs spironolactone. She has never tried spirolactone but had been on steroids as a kid  - she cannot remember the name or dose. \par -start prednisone 2.5mg po qhs. We discussed that at such a low dose little chance of weight gain, diabetes, or other side effects.\par -check labs in 6 weeks to see if hormonally better\par 2) Hyperprl: labs done this summer are WNL. Pt with rare breastmilk.\par Return in 4-6 months

## 2020-11-23 ENCOUNTER — NON-APPOINTMENT (OUTPATIENT)
Age: 30
End: 2020-11-23

## 2021-01-07 LAB
ANDROST SERPL-MCNC: 485 NG/DL
DHEA SERPL-MCNC: 875 NG/DL
TESTOST BND SERPL-MCNC: 5.4 PG/ML
TESTOST SERPL-MCNC: 44 NG/DL

## 2021-02-24 ENCOUNTER — NON-APPOINTMENT (OUTPATIENT)
Age: 31
End: 2021-02-24

## 2021-03-18 ENCOUNTER — APPOINTMENT (OUTPATIENT)
Dept: ENDOCRINOLOGY | Facility: CLINIC | Age: 31
End: 2021-03-18
Payer: MEDICAID

## 2021-03-18 VITALS
DIASTOLIC BLOOD PRESSURE: 80 MMHG | HEART RATE: 97 BPM | TEMPERATURE: 98.1 F | BODY MASS INDEX: 24.33 KG/M2 | OXYGEN SATURATION: 99 % | SYSTOLIC BLOOD PRESSURE: 115 MMHG | WEIGHT: 133 LBS

## 2021-03-18 PROCEDURE — 99072 ADDL SUPL MATRL&STAF TM PHE: CPT

## 2021-03-18 PROCEDURE — 99213 OFFICE O/P EST LOW 20 MIN: CPT

## 2021-03-18 NOTE — HISTORY OF PRESENT ILLNESS
[FreeTextEntry1] : 29 yo with hx of NCAH and  here for f/u. She has a hx of hyperPRl s/p pregnancy of her son 7 years ago. She only  him for 2-3 months but continued to make milk. She only has it if her nipples are squeezed. Last level was normal. \par She is intolerant of OCPs due to yeast infections or BV. She has also tried an IUD but it was embedded x 2 as well as shaheen-ring which caused vaginitis. She was scared to try the norplant and never was offered a patch. \par She is taking 2.5mg po qhs of prednisolone. For 4 months she had 27-30 day menstrual cycles and then it increased to 40-days or more. LMP is 3-14. Hair growth is unchanged. She notes no change in weight. She is currently sexually active, using condoms. Potentially would like to have kids but not in the next 1-2 years. \par \par Her sister is 31 and has NCAH and pituitary cyst, she is currently undergoing IVF. \par \par GYN: DESHAUN Schmidt at [facility]

## 2021-03-18 NOTE — ASSESSMENT
[FreeTextEntry1] : 29 yo with hx of NCAH and  here for f/u. She has a hx of hyperPRL s/p pregnancy of her son 7 years ago. \par 1) NCAH: pt with acne and hirsutism due to elevated androstenedione and testosterone. \par -continue prednisone 2.5mg po qhs. She is not wanting to increase to 5mg po qhs.\par -Pt is interested in trying spironolactone, so will start on 50mg po qdaily, and she can increase to BID after 2 weeks. She heard about it on a [facility] blog. \par -check labs in 2 weeks to see if hormonally better\par 2) Hyperprloctinemia: check PRL. \par Return in 4-6 months. \par

## 2021-03-18 NOTE — PHYSICAL EXAM
[Alert] : alert [Well Nourished] : well nourished [Healthy Appearance] : healthy appearance [No Acute Distress] : no acute distress [Normal Sclera/Conjunctiva] : normal sclera/conjunctiva [No Proptosis] : no proptosis [Normal S1, S2] : normal S1 and S2 [Normal Rate] : heart rate was normal [Regular Rhythm] : with a regular rhythm [Normal Bowel Sounds] : normal bowel sounds [Not Tender] : non-tender [Not Distended] : not distended [Soft] : abdomen soft [Normal Affect] : the affect was normal [Normal Mood] : the mood was normal

## 2021-04-15 ENCOUNTER — TRANSCRIPTION ENCOUNTER (OUTPATIENT)
Age: 31
End: 2021-04-15

## 2021-04-19 LAB
ANDROST SERPL-MCNC: 864 NG/DL
DHEA-SULFATE, SERUM: 353 UG/DL
PROLACTIN SERPL-MCNC: 38.1 NG/ML
TESTOST BND SERPL-MCNC: 11.7 PG/ML
TESTOST SERPL-MCNC: 63.6 NG/DL

## 2021-06-04 ENCOUNTER — RX RENEWAL (OUTPATIENT)
Age: 31
End: 2021-06-04

## 2021-09-23 ENCOUNTER — APPOINTMENT (OUTPATIENT)
Dept: ENDOCRINOLOGY | Facility: CLINIC | Age: 31
End: 2021-09-23
Payer: MEDICAID

## 2021-09-23 VITALS
DIASTOLIC BLOOD PRESSURE: 70 MMHG | TEMPERATURE: 98.4 F | HEART RATE: 90 BPM | OXYGEN SATURATION: 98 % | BODY MASS INDEX: 23.23 KG/M2 | WEIGHT: 127 LBS | SYSTOLIC BLOOD PRESSURE: 116 MMHG

## 2021-09-23 PROCEDURE — 99213 OFFICE O/P EST LOW 20 MIN: CPT

## 2021-09-23 RX ORDER — PREDNISONE 2.5 MG/1
2.5 TABLET ORAL
Qty: 30 | Refills: 5 | Status: DISCONTINUED | COMMUNITY
Start: 2020-11-05 | End: 2021-09-23

## 2021-09-23 RX ORDER — SPIRONOLACTONE 50 MG/1
50 TABLET ORAL
Qty: 360 | Refills: 1 | Status: DISCONTINUED | COMMUNITY
Start: 2021-03-18 | End: 2021-09-23

## 2021-09-30 LAB
ANDROST SERPL-MCNC: 506 NG/DL
PROLACTIN SERPL-MCNC: 17.7 NG/ML
TESTOST BND SERPL-MCNC: 5.9 PG/ML
TESTOSTERONE TOTAL S: 64 NG/DL

## 2021-09-30 NOTE — ADDENDUM
[FreeTextEntry1] : 9/30 320pm: discussed with patient that her labs have improved so she will stay on the small dose fo prednisone 2.5mg po qdaily. She was unable to get her vaniqua cream so I asked our pharmacist to help with this.

## 2021-09-30 NOTE — PHYSICAL EXAM
[Alert] : alert [Well Nourished] : well nourished [No Acute Distress] : no acute distress [No Respiratory Distress] : no respiratory distress [Normal Rate and Effort] : normal respiratory rate and effort [Clear to Auscultation] : lungs were clear to auscultation bilaterally [Normal S1, S2] : normal S1 and S2 [Normal Rate] : heart rate was normal [Regular Rhythm] : with a regular rhythm [Normal Bowel Sounds] : normal bowel sounds [Not Tender] : non-tender [Not Distended] : not distended [Soft] : abdomen soft [de-identified] : +numerous tattoos on legs b/l

## 2021-09-30 NOTE — HISTORY OF PRESENT ILLNESS
[FreeTextEntry1] : 30 yo with hx of NCAH and  here for f/u. She has a hx of hyperPRl s/p pregnancy of her son 7 years ago. She only  him for 2-3 months but continued to make milk. She only has it if her nipples are squeezed. Last level was normal. \par She is intolerant of OCPs due to yeast infections or BV. She has also tried an IUD but it was embedded x 2 as well as shaheen-ring which caused vaginitis. She was scared to try the norplant and never was offered a patch. She is currently using condoms. \par \par She is taking 2.5mg po qhs of prednisolone at bedtime. She took spironolactone for 2-3 weeks as her GYN scared her and told her that she needs to to be on OCP. Her period has been 28-31 days and comes monthly. Hair growth is unchanged. She She notes no change in weight.. Potentially would like to have kids but not in the next 1-2 years. She got engaged this summer. Plans to get  9/10/22 in the [employer]. \par After the second vaccine in April/May she had 2 periods which came early. She also had breast pain daily for 1-1.5 weeks which went away. LMP: -.\par \par Her sister is 31 and has NCAH and pituitary cyst, she did IVF and got pregnant with twins, unfortunately she miscarried one baby at week 14 (last week). She was on steroids until one month into pregnancy. \par \par GYN: [facility]

## 2021-09-30 NOTE — ASSESSMENT
[FreeTextEntry1] : 32 yo with hx of NCAH and  here for f/u. She has a hx of hyperPRL s/p pregnancy of her son 7 years ago. \par 1) NCAH: pt with acne and hirsutism due to elevated androstenedione and testosterone. She cannot tolerate OCPs and was apprehensive about using steroids so I gave her prednisone 2.5mg po qhs which helped lower her levels but the hirsutism continued. She did not want to try a physiological dose.\par -Her gyn scared her out of trying spironolactone after 2-3 weeks. \par -check levels today: androstenedione, testosterone and DHEA-s.\par -pt willing to try topical cream to reduce hirsutism: vaniqua topical BID.\par 2) Hyperprl: check PRL. \par Return in 1 year, will transition to Dr. Hernandes as I am leaving in December.\par

## 2021-10-01 LAB — DHEA-SULFATE, SERUM: 284 UG/DL

## 2022-10-18 ENCOUNTER — APPOINTMENT (OUTPATIENT)
Dept: ENDOCRINOLOGY | Facility: CLINIC | Age: 32
End: 2022-10-18

## 2022-10-18 VITALS
WEIGHT: 130 LBS | OXYGEN SATURATION: 99 % | DIASTOLIC BLOOD PRESSURE: 62 MMHG | HEART RATE: 91 BPM | TEMPERATURE: 98 F | SYSTOLIC BLOOD PRESSURE: 118 MMHG | BODY MASS INDEX: 23.78 KG/M2

## 2022-10-18 PROCEDURE — 99215 OFFICE O/P EST HI 40 MIN: CPT

## 2022-10-18 RX ORDER — PREDNISONE 2.5 MG/1
2.5 TABLET ORAL
Qty: 30 | Refills: 5 | Status: DISCONTINUED | COMMUNITY
Start: 2021-09-30 | End: 2022-10-18

## 2022-10-18 NOTE — ASSESSMENT
[FreeTextEntry1] : 31 yo F with non-classic CAH and prior hyperprolactinemia here for follow up.\par \par 1. NCCAH\par - Her main symptoms have been acne and hirsutism. Discussed options including steroids, spironolactone and OCP. Pt has had bad experience with OCP and prefers to not be on one. Has been on chronic prednisone at 2.5 mg, interested in increasing dose. Discussed that glucocorticoids are indicated for women with NCCAH planning for pregnancy, it is a safe glucocorticoid (as opposed to dex which can cross placenta and cause fetal HPA axis suppression). during pregnancy, no need for lab monitoring as 17OHP and androgens go up in pregnancy, just follow clinically. Guidelines on glucocorticoid tx in NCCAH are vague - can consider low dose prednisone if used pre-conception whereas some clinicians will stop steroid once pt is pregnant. Pt has been on chronic steroids, will  consider continuing and pt will likely need stress dose steroids for delivery. Pt will notify me once pregnant\par - increase to prednisone 5 mg daily\par -recommend bone density for baseline given chronic steroid use\par -repeat 17-OHP,  DHEAS, testosterone, androstenedione today\par -refer to reproductive endocrinology for consultation given pregnancy plans: fertility and genetic counseling as needed\par -adrenal imaging in 2019 revealed no abnormal adrenal pathology\par -pt prefers to continue chronic GC treatment, feels unwell off of it. Discussed risks of osteoporosis and tertiary adrenal insufficiency/steroid dependence\par - topical cream to reduce hirsutism: vaniqua topical BID - was not covered, would avoid anyway now given planning for pregnancy\par \par 2. Hyperprolactinemia\par -repeat prolactin and macroprolactin levels\par -LMP now\par -no pituitary adenoma on imaging; surveillance imaging if prolactin levels increase\par \par

## 2022-10-18 NOTE — HISTORY OF PRESENT ILLNESS
[FreeTextEntry1] : 31 yo F with non-classic CAH and prior hyperprolactinemia here for follow up.\par Here with , recently got  [facility].\par \par CAH was diagnosed at age 5 years old. Patient started to have hair growth, breast development came early, growth was fast. At that time, she had signs of androgenization. She was on a "daily medication" until 9 or 10 years old and then she came off of her medication. Patient was evaluated by an endocrine doctor but was a loss to follow up. \par Menarche at 10-12 year old, was on birth control from 17 years old until 20 years for a diagnosis of ovarian cysts. Patient came off of birth control pill due to chronic yeast/bacterial infections so she just stopped the OCP. Then one year after stopping OCP she got pregnant. No history of vaginoplasty, no ambiguous genitalia history. \par Tried OCPs in past, several times, but had infections. IUD x 2 got imbedded. Currently utilizing condoms. She attempted birth control again but developed infections so declines further birth control pills\par Patient had lost 12 pounds with diet and exercise.\par She has cramps and bloating prior to period\par  she did IVF and got pregnant with twins in 2021, unfortunately she miscarried at week 14. She was on steroids until one month into pregnancy. Has an 8 year old son, was not on steroids for that pregnancy nor before.\par \par currently on prednisone 2.5 mg QHS. Interested in increasing dose.\par Not interested in OCP \par Bothersome by facial hair growth, facial acne.\par \par LMP: today,  9/15, regular and monthly, cycle 27-30 days; only lasts 3 days\par pregnancy plans: interested in pregnancy soon\par She took spironolactone for 2-3 weeks as her GYN scared her and told her that she needs to to be on OCP.\par \par Had breast milk discharge in 2020, none since. No nipple stimulation. No visual field cuts. Not on any anti-psychotic agents, just Xanax as needed for anxiety and depression. No new meds. She has a hx of hyperPRl s/p pregnancy of her son 7 years ago. She only  him for 2-3 months but continued to make milk.Last level was normal. \par Pituitary MRI August 2019 did NOT reveal pituitary pathology, no pituitary adenoma identified\par \par February 2020 labs revealed negative salivary cortisol and normal prolactin levels, DHEAS 524, improved androstenedione levels and 17 OHP in normative ranges.\par \par Sister also with NCAH and pituitary cyst.

## 2022-10-18 NOTE — PHYSICAL EXAM
[Alert] : alert [Well Nourished] : well nourished [No Acute Distress] : no acute distress [Well Developed] : well developed [Normal Sclera/Conjunctiva] : normal sclera/conjunctiva [EOMI] : extra ocular movement intact [No Proptosis] : no proptosis [Normal Oropharynx] : the oropharynx was normal [Thyroid Not Enlarged] : the thyroid was not enlarged [No Thyroid Nodules] : no palpable thyroid nodules [No Respiratory Distress] : no respiratory distress [No Accessory Muscle Use] : no accessory muscle use [Clear to Auscultation] : lungs were clear to auscultation bilaterally [Normal S1, S2] : normal S1 and S2 [Normal Rate] : heart rate was normal [Regular Rhythm] : with a regular rhythm [No Edema] : no peripheral edema [Pedal Pulses Normal] : the pedal pulses are present [Normal Bowel Sounds] : normal bowel sounds [Not Tender] : non-tender [Not Distended] : not distended [Soft] : abdomen soft [Normal Anterior Cervical Nodes] : no anterior cervical lymphadenopathy [No Spinal Tenderness] : no spinal tenderness [Normal Posterior Cervical Nodes] : no posterior cervical lymphadenopathy [Spine Straight] : spine straight [No Stigmata of Cushings Syndrome] : no stigmata of Cushings Syndrome [Normal Gait] : normal gait [Normal Strength/Tone] : muscle strength and tone were normal [No Rash] : no rash [Abdominal Striae] : no abdominal striae [Acne] : acne present [No Tremors] : no tremors [Oriented x3] : oriented to person, place, and time [Normal Affect] : the affect was normal [Normal Insight/Judgement] : insight and judgment were intact

## 2022-10-21 ENCOUNTER — TRANSCRIPTION ENCOUNTER (OUTPATIENT)
Age: 32
End: 2022-10-21

## 2022-10-21 LAB
25(OH)D3 SERPL-MCNC: 23.8 NG/ML
ALBUMIN SERPL ELPH-MCNC: 5 G/DL
ALP BLD-CCNC: 64 U/L
ALT SERPL-CCNC: 13 U/L
ANION GAP SERPL CALC-SCNC: 13 MMOL/L
AST SERPL-CCNC: 13 U/L
BILIRUB SERPL-MCNC: 0.5 MG/DL
BUN SERPL-MCNC: 8 MG/DL
CALCIUM SERPL-MCNC: 10.3 MG/DL
CHLORIDE SERPL-SCNC: 103 MMOL/L
CHOLEST SERPL-MCNC: 190 MG/DL
CO2 SERPL-SCNC: 24 MMOL/L
CREAT SERPL-MCNC: 0.7 MG/DL
EGFR: 119 ML/MIN/1.73M2
ESTIMATED AVERAGE GLUCOSE: 103 MG/DL
GLUCOSE SERPL-MCNC: 82 MG/DL
HBA1C MFR BLD HPLC: 5.2 %
HDLC SERPL-MCNC: 77 MG/DL
LDLC SERPL CALC-MCNC: 98 MG/DL
NONHDLC SERPL-MCNC: 113 MG/DL
POTASSIUM SERPL-SCNC: 4 MMOL/L
PROLACTIN SERPL-MCNC: 16 NG/ML
PROT SERPL-MCNC: 7.9 G/DL
SODIUM SERPL-SCNC: 140 MMOL/L
TRIGL SERPL-MCNC: 78 MG/DL

## 2022-10-21 RX ORDER — EFLORNITHINE HYDROCHLORIDE 139 MG/G
13.9 CREAM TOPICAL TWICE DAILY
Qty: 1 | Refills: 5 | Status: DISCONTINUED | COMMUNITY
Start: 2021-09-23 | End: 2022-10-21

## 2022-10-21 RX ORDER — CHOLECALCIFEROL (VITAMIN D3) 25 MCG
25 MCG TABLET ORAL DAILY
Qty: 90 | Refills: 1 | Status: ACTIVE | COMMUNITY
Start: 2022-10-21 | End: 1900-01-01

## 2022-10-25 ENCOUNTER — TRANSCRIPTION ENCOUNTER (OUTPATIENT)
Age: 32
End: 2022-10-25

## 2022-10-25 LAB
17OHP SERPL-MCNC: 994 NG/DL
ANDROST SERPL-MCNC: 906 NG/DL
DHEA-SULFATE, SERUM: 292 UG/DL
MONOMERIC PROLACTIN (ICMA)*: 14.5 NG/ML
PERCENT MACROPROLACTIN: 16 %
PROLACTIN, SERUM (ICMA)*: 17.2 NG/ML
SHBG-ESOTERIX: 65.7 NMOL/L
TESTOST FREE SERPL-MCNC: 9.3 PG/ML
TESTOST SERPL-MCNC: 86.5 NG/DL

## 2022-11-23 ENCOUNTER — NON-APPOINTMENT (OUTPATIENT)
Age: 32
End: 2022-11-23

## 2022-11-28 ENCOUNTER — TRANSCRIPTION ENCOUNTER (OUTPATIENT)
Age: 32
End: 2022-11-28

## 2022-12-01 ENCOUNTER — NON-APPOINTMENT (OUTPATIENT)
Age: 32
End: 2022-12-01

## 2022-12-07 ENCOUNTER — APPOINTMENT (OUTPATIENT)
Dept: OBGYN | Facility: CLINIC | Age: 32
End: 2022-12-07
Payer: COMMERCIAL

## 2022-12-07 ENCOUNTER — RESULT CHARGE (OUTPATIENT)
Age: 32
End: 2022-12-07

## 2022-12-07 VITALS
HEIGHT: 62 IN | DIASTOLIC BLOOD PRESSURE: 60 MMHG | WEIGHT: 138 LBS | BODY MASS INDEX: 25.4 KG/M2 | SYSTOLIC BLOOD PRESSURE: 100 MMHG

## 2022-12-07 LAB — HCG UR QL: POSITIVE

## 2022-12-07 PROCEDURE — 81025 URINE PREGNANCY TEST: CPT

## 2022-12-07 PROCEDURE — 99203 OFFICE O/P NEW LOW 30 MIN: CPT

## 2022-12-07 PROCEDURE — 76815 OB US LIMITED FETUS(S): CPT

## 2022-12-07 NOTE — PHYSICAL EXAM
[Appropriately responsive] : appropriately responsive [Alert] : alert [No Acute Distress] : no acute distress [Soft] : soft [Non-tender] : non-tender [Non-distended] : non-distended [No HSM] : No HSM [No Lesions] : no lesions [No Mass] : no mass [Oriented x3] : oriented x3 [Labia Majora] : normal [Labia Minora] : normal [Normal] :  normal

## 2022-12-07 NOTE — PROCEDURE
[Transvaginal OB Sonogram] : Transvaginal OB Sonogram [Intrauterine Pregnancy] : intrauterine pregnancy [Yolk Sac] : yolk sac present [Fetal Heart] : fetal heart present [CRL: ___ (mm)] : CRL - [unfilled]Umm [Date: ___] : Date: [unfilled] [Current GA by Sonogram: ___ (wks)] : Current GA by Sonogram: [unfilled]Uwks [___ day(s)] : [unfilled] days [Transvaginal OB Sonogram WNL] : Transvaginal OB Sonogram WNL [FreeTextEntry1] : Intrauterine pregnancy, fundally located, with a flickering fetal heart

## 2022-12-07 NOTE — PLAN
[FreeTextEntry1] : \par Transvaginal/pelvic ultrasound consistent with a intrauterine pregnancy, fundally located, with a flickering fetal heart.  Patient was advised to return to office in 2 weeks time; as this would be optimal timing for dating and fetal heart rate should be clearly visualized at this gestation.\par \par Obstetrical history of 1 prior  section and she plans to go forward with a repeat  section at the time of delivery\par \par Medical history significant for congenital adrenal hyperplasia and she is currently prednisone 5 mg daily.  She may require increased doses of this medication and stress dose at the time of delivery.  She is followed closely by endocrinology, but was handed an Rx for maternal-fetal medicine consultation and for genetic counseling.\par \par She is given opportunity ask questions all questions were addressed.  To return to office in 2 weeks time for confirmation of viability, and dating.  She understands plan of care.

## 2022-12-07 NOTE — COUNSELING
[Nutrition/ Exercise/ Weight Management] : nutrition, exercise, weight management [Vitamins/Supplements] : vitamins/supplements [Pregnancy Options] : pregnancy options [Confidentiality] : confidentiality [STD (testing, results, tx)] : STD (testing, results, tx) [Medication Management] : medication management [Other ___] : [unfilled]

## 2022-12-07 NOTE — HISTORY OF PRESENT ILLNESS
[FreeTextEntry1] : 32-year-old female -0-0-1 presenting office for confirmation of pregnancy.  Patient has not had an ultrasound to confirm intrauterine pregnancy to this date, but does have positive at home urine pregnancy test.  Her LMP is 10/18/2022 placing her at 7 weeks 1 day gestational with an EDC of 2023.\par \par Obstetrical history 1 prior delivery 9 years ago, via  section due to nonreassuring fetal heart tracing.  Gynecologic history significant for an ovarian cyst not requiring surgical intervention, but she was on an oral contraceptive for several years.  She also reports a history of HSV; she is unsure if it is type I or II.  Medical history significant for congenital adrenal hyperplasia she is currently on prednisone 5 mg daily and followed closely via endocrinology.  Denies additional surgical history.  Family history of her mother having breast cancer twice; she has undergone genetic carrier screening and tested negative.  She did test at risk for colon cancer and then underwent a colonoscopy and was directed to return in 5 years time.  Denies alcohol, tobacco, illicit drug use.  Denies allergies to medications.

## 2022-12-16 ENCOUNTER — ASOB RESULT (OUTPATIENT)
Age: 32
End: 2022-12-16

## 2022-12-16 ENCOUNTER — APPOINTMENT (OUTPATIENT)
Dept: MATERNAL FETAL MEDICINE | Facility: CLINIC | Age: 32
End: 2022-12-16

## 2022-12-16 ENCOUNTER — TRANSCRIPTION ENCOUNTER (OUTPATIENT)
Age: 32
End: 2022-12-16

## 2022-12-16 PROCEDURE — 99202 OFFICE O/P NEW SF 15 MIN: CPT | Mod: 95

## 2022-12-21 ENCOUNTER — APPOINTMENT (OUTPATIENT)
Dept: OBGYN | Facility: CLINIC | Age: 32
End: 2022-12-21

## 2022-12-21 VITALS
WEIGHT: 140 LBS | HEIGHT: 62 IN | BODY MASS INDEX: 25.76 KG/M2 | SYSTOLIC BLOOD PRESSURE: 120 MMHG | DIASTOLIC BLOOD PRESSURE: 70 MMHG

## 2022-12-21 PROCEDURE — 76815 OB US LIMITED FETUS(S): CPT

## 2022-12-21 PROCEDURE — 99214 OFFICE O/P EST MOD 30 MIN: CPT

## 2022-12-21 NOTE — PLAN
[FreeTextEntry1] : \par Transvaginal/pelvic ultrasound significant for a viable intrauterine pregnancy with CRL agree with reported dating via LMP.  EDC will remain 2023.  Patient has an obstetrical history of 1 prior  section and plans to go forward with a repeat  section at the time of delivery.  Patient's medical history is significant for congenital adrenal hyperplasia and she is currently on prednisone 5 mg daily.  She may require increased doses of this medication and stress dose at the time of delivery.  She is followed closely by endocrinology, and has an maternal-fetal medicine consultation scheduled in the coming weeks along with genetic counseling.  She is given opportunity ask questions and all questions were addressed.  She understands the plan of care.

## 2022-12-21 NOTE — HISTORY OF PRESENT ILLNESS
[FreeTextEntry1] : 32-year-old female -0-0-1 presenting office for confirmation of viability.  Her LMP is 10/18/2022 placing her at 9 weeks 1 day gestation with an EDC of 2023.  Patient was seen in office on 2022 and an intrauterine pregnancy was visualized measuring 6 weeks 2 days, with a flickering fetal heart.  Patient was directed to return to office for optimal dating window and to confirm viability of the pregnancy.  Patient has scheduled with maternal-fetal medicine secondary to her history of congenital adrenal hyperplasia and has an appointment in 2 weeks time.\par \par Obstetrical history 1 prior delivery 9 years ago, via  section due to nonreassuring fetal heart tracing.  Gynecologic history significant for an ovarian cyst not requiring surgical intervention, but she was on an oral contraceptive for several years.  She also reports a history of HSV; she is unsure if it is type I or II.  Medical history significant for congenital adrenal hyperplasia she is currently on prednisone 5 mg daily and followed closely via endocrinology.  Denies additional surgical history.  Family history of her mother having breast cancer twice; she has undergone genetic carrier screening and tested negative.  She did test at risk for colon cancer and then underwent a colonoscopy and was directed to return in 5 years time.  Denies alcohol, tobacco, illicit drug use.  Denies allergies to medications.

## 2022-12-21 NOTE — PROCEDURE
[Transvaginal OB Sonogram] : Transvaginal OB Sonogram [Intrauterine Pregnancy] : intrauterine pregnancy [Yolk Sac] : yolk sac present [Fetal Heart] : fetal heart present [CRL: ___ (mm)] : CRL - [unfilled]Umm [Date: ___] : Date: [unfilled] [Current GA by Sonogram: ___ (wks)] : Current GA by Sonogram: [unfilled]Uwks [___ day(s)] : [unfilled] days [Transvaginal OB Sonogram WNL] : Transvaginal OB Sonogram WNL [FreeTextEntry1] : Viable intrauterine pregnancy, fetal heart rate 166 bpm, fundally located.  CRL agrees with reported dating via LMP

## 2023-01-01 ENCOUNTER — NON-APPOINTMENT (OUTPATIENT)
Age: 33
End: 2023-01-01

## 2023-01-04 ENCOUNTER — APPOINTMENT (OUTPATIENT)
Dept: OBGYN | Facility: CLINIC | Age: 33
End: 2023-01-04

## 2023-01-08 ENCOUNTER — NON-APPOINTMENT (OUTPATIENT)
Age: 33
End: 2023-01-08

## 2023-01-09 ENCOUNTER — LABORATORY RESULT (OUTPATIENT)
Age: 33
End: 2023-01-09

## 2023-01-09 ENCOUNTER — NON-APPOINTMENT (OUTPATIENT)
Age: 33
End: 2023-01-09

## 2023-01-09 ENCOUNTER — APPOINTMENT (OUTPATIENT)
Dept: ANTEPARTUM | Facility: CLINIC | Age: 33
End: 2023-01-09
Payer: COMMERCIAL

## 2023-01-09 ENCOUNTER — APPOINTMENT (OUTPATIENT)
Dept: OBGYN | Facility: CLINIC | Age: 33
End: 2023-01-09
Payer: COMMERCIAL

## 2023-01-09 ENCOUNTER — ASOB RESULT (OUTPATIENT)
Age: 33
End: 2023-01-09

## 2023-01-09 ENCOUNTER — APPOINTMENT (OUTPATIENT)
Dept: MATERNAL FETAL MEDICINE | Facility: CLINIC | Age: 33
End: 2023-01-09
Payer: COMMERCIAL

## 2023-01-09 VITALS
BODY MASS INDEX: 25.4 KG/M2 | DIASTOLIC BLOOD PRESSURE: 62 MMHG | WEIGHT: 138 LBS | HEIGHT: 62 IN | HEART RATE: 84 BPM | RESPIRATION RATE: 16 BRPM | SYSTOLIC BLOOD PRESSURE: 114 MMHG | OXYGEN SATURATION: 98 %

## 2023-01-09 VITALS
SYSTOLIC BLOOD PRESSURE: 110 MMHG | BODY MASS INDEX: 25.4 KG/M2 | WEIGHT: 138 LBS | HEIGHT: 62 IN | DIASTOLIC BLOOD PRESSURE: 70 MMHG

## 2023-01-09 DIAGNOSIS — Z78.9 OTHER SPECIFIED HEALTH STATUS: ICD-10-CM

## 2023-01-09 DIAGNOSIS — Z86.39 PERSONAL HISTORY OF OTHER ENDOCRINE, NUTRITIONAL AND METABOLIC DISEASE: ICD-10-CM

## 2023-01-09 DIAGNOSIS — Z71.85 ENCOUNTER FOR IMMUNIZATION SAFETY COUNSELING: ICD-10-CM

## 2023-01-09 DIAGNOSIS — Z86.59 PERSONAL HISTORY OF OTHER MENTAL AND BEHAVIORAL DISORDERS: ICD-10-CM

## 2023-01-09 DIAGNOSIS — K64.9 UNSPECIFIED HEMORRHOIDS: ICD-10-CM

## 2023-01-09 DIAGNOSIS — K64.4 RESIDUAL HEMORRHOIDAL SKIN TAGS: ICD-10-CM

## 2023-01-09 DIAGNOSIS — Z87.898 PERSONAL HISTORY OF OTHER SPECIFIED CONDITIONS: ICD-10-CM

## 2023-01-09 DIAGNOSIS — Z86.19 PERSONAL HISTORY OF OTHER INFECTIOUS AND PARASITIC DISEASES: ICD-10-CM

## 2023-01-09 DIAGNOSIS — K64.8 OTHER HEMORRHOIDS: ICD-10-CM

## 2023-01-09 DIAGNOSIS — Z87.59 PERSONAL HISTORY OF OTHER COMPLICATIONS OF PREGNANCY, CHILDBIRTH AND THE PUERPERIUM: ICD-10-CM

## 2023-01-09 DIAGNOSIS — L68.0 HIRSUTISM: ICD-10-CM

## 2023-01-09 DIAGNOSIS — R79.89 OTHER SPECIFIED ABNORMAL FINDINGS OF BLOOD CHEMISTRY: ICD-10-CM

## 2023-01-09 PROCEDURE — 76813 OB US NUCHAL MEAS 1 GEST: CPT

## 2023-01-09 PROCEDURE — 99204 OFFICE O/P NEW MOD 45 MIN: CPT

## 2023-01-09 PROCEDURE — 0501F PRENATAL FLOW SHEET: CPT

## 2023-01-09 PROCEDURE — ZZZZZ: CPT

## 2023-01-09 PROCEDURE — 36415 COLL VENOUS BLD VENIPUNCTURE: CPT

## 2023-01-09 RX ORDER — CLOTRIMAZOLE AND BETAMETHASONE DIPROPIONATE 10; .5 MG/G; MG/G
1-0.05 CREAM TOPICAL TWICE DAILY
Qty: 1 | Refills: 3 | Status: ACTIVE | COMMUNITY
Start: 2023-01-09 | End: 1900-01-01

## 2023-01-09 NOTE — FAMILY HISTORY
[Age 35+ During Pregnancy] : not 35 or over during pregnancy [Reported Family History Of Birth Defects] : no congenital heart defects [Jesse-Sachs Carrier] : no Jesse-Sachs [Family History] : no mental retardation/autism [Reported Family History Of Genetic Disease] : no history of child defect in child of baby father [FreeTextEntry1] : Pt has congenital adrenal hyperplasia, FOB tested, results pending

## 2023-01-09 NOTE — VITALS
[LMP (date): ___] : LMP was on [unfilled] [GA =___ Weeks] : which calculates to a GA of [unfilled] weeks [GA= ___ Days] : and [unfilled] day(s) [LIZ by LMP (date): ___] : The calculated LIZ by LMP is [unfilled] [By LMP] : this is the final LIZ

## 2023-01-09 NOTE — DISCUSSION/SUMMARY
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 Her condition is being treated by an endocrinologist.  She currently takes 5 mg of prednisone daily. She had genetic counseling on 2022. The father of the baby is being tested to determine whether he is a carrier for congenital adrenal hyperplasia. She had a prior pregnancy at age 22 with a different partner.  She has a son who is a carrier for congenital adrenal hyperplasia.  I reminded her that congenital adrenal hyperplasia results from inherited enzymatic defects of steroidogenesis in the adrenal gland.  The most common defect is 21-hydroxylase deficiency.  The second most common form is 11-hydroxylase deficiency.  It is an autosomal recessive disorder with approximately 1% being new mutations.  She was told that all pregnancies have a 2 to 3% chance an infant born with a birth defect.  She decided not to have prenatal diagnostic testing.  Today she had a first trimester screen test.  Blood was drawn on  for a noninvasive prenatal screen test and universal carrier screening.  She was scheduled to have a sequential screening test and a detailed fetal anatomy ultrasound examination at 20 weeks of gestation.  I told her that the [place]  screening program should test the infant for congenital adrenal hyperplasia.  She was advised to notify her pediatrician of the diagnosis of congenital adrenal hyperplasia. \par \par She had a prior  section delivery. She was informed of the risks and benefits of a trial of labor. She was informed of the risk of uterine rupture and the associated maternal complications such as hysterectomy, blood transfusions, and intensive care unit admission. She was also informed of the associated  adverse outcomes in cases of uterine rupture such as stillbirth, fetal hypoxia and neurological impairment (cerebral palsy). She expressed a desire to have a repeat  section birth with the current pregnancy.  She will be need stress doses of steroids for her delivery and postpartum course.\par \par I recommended having a glucola challenge test to screen for gestational diabetes between 24 and 28 weeks gestation.  I recommended having the COVID 19 vaccine during pregnancy if not already vaccinated. She told me that she had the Pfizer COVID-19 vaccines during .  I also recommend having a COVID 19 vaccine booster during pregnancy if vaccinated and no booster vaccine after 6 months from the last vaccine injection. She received the Pfizer COVID-19 vaccine booster during .  I recommended having the Tdap vaccine between 27 and 36 weeks of gestation to prevent pertussis infection in the  infant. I recommended having the flu vaccine during flu season (October through May). She should have serial ultrasounds to evaluate fetal growth and development.

## 2023-01-09 NOTE — OB HISTORY
[Pregnancy History] : patient received anesthesia [LMP: ___] : LMP: [unfilled] [LIZ: ___] : LIZ: [unfilled] [EGA: ___ wks] : EGA: [unfilled] wks [Spontaneous] : Spontaneous conception [Sonogram] : sonogram [at ___ wks] : at [unfilled] weeks [Definite:  ___ (Date)] : the last menstrual period was [unfilled] [Normal Amount/Duration] : was of a normal amount and duration [Regular Cycle Intervals] : periods have been regular [Frequency: Q ___ days] : menstrual periods occur approximately every [unfilled] days [Menarche Age: ____] : age at menarche was [unfilled] [___] : Living: [unfilled] [Spotting Between  Menses] : no spotting between menses [Menstrual Cramps] : no menstrual cramps [On BCP at conception] : the patient was not on BCP at conception

## 2023-01-12 LAB
ADDITIONAL US: NORMAL
COMMENTS: AFP: NORMAL
CRL SCAN TWIN B: NORMAL
CRL SCAN: NORMAL
CROWN RUMP LENGTH TWIN B: NORMAL
CROWN RUMP LENGTH: 46.4 MM
DOWN SYNDROME AGE RISK: NORMAL
DOWN SYNDROME INTERPRETATION: NORMAL
DOWN SYNDROME SCREENING RISK: NORMAL
GEST. AGE ON COLLECTION DATE: 11.3 WEEKS
HCG MOM: 1.29
HCG VALUE: 154.7 IU/ML
MATERNAL AGE AT EDD: 32.2 YR
NOTE: AFP: NORMAL
NT MOM TWIN B: NORMAL
NT TWIN B: NORMAL
NUCHAL TRANSLUCENCY (NT): 1.7 MM
NUCHAL TRANSLUCENCY MOM: 1.35
NUMBER OF FETUSES: 1
PAPP-A MOM: 1.42
PAPP-A VALUE: 921.1 NG/ML
RACE: NORMAL
RESULTS AFP: NORMAL
SONOGRAPHER ID#: NORMAL
SUBMIT PART 2 SAMPLE USING: NORMAL
TEST RESULTS: AFP: NORMAL
TRISOMY 18 AGE RISK: NORMAL
TRISOMY 18 INTERPRETATION: NORMAL
TRISOMY 18 SCREENING RISK: NORMAL
WEIGHT AFP: 138 LBS

## 2023-01-16 ENCOUNTER — NON-APPOINTMENT (OUTPATIENT)
Age: 33
End: 2023-01-16

## 2023-01-30 ENCOUNTER — APPOINTMENT (OUTPATIENT)
Dept: OBGYN | Facility: CLINIC | Age: 33
End: 2023-01-30

## 2023-02-07 ENCOUNTER — APPOINTMENT (OUTPATIENT)
Dept: OBGYN | Facility: CLINIC | Age: 33
End: 2023-02-07
Payer: COMMERCIAL

## 2023-02-07 VITALS
WEIGHT: 145 LBS | BODY MASS INDEX: 26.68 KG/M2 | SYSTOLIC BLOOD PRESSURE: 124 MMHG | HEIGHT: 62 IN | DIASTOLIC BLOOD PRESSURE: 78 MMHG

## 2023-02-07 PROCEDURE — 0502F SUBSEQUENT PRENATAL CARE: CPT

## 2023-02-13 LAB
AFP MOM: 0.76
AFP VALUE: 26.7 NG/ML
ALPHA FETOPROTEIN SERUM COMMENT: NORMAL
ALPHA FETOPROTEIN SERUM INTERPRETATION: NORMAL
ALPHA FETOPROTEIN SERUM RESULTS: NORMAL
ALPHA FETOPROTEIN SERUM TEST RESULTS: NORMAL
GESTATIONAL AGE BASED ON: NORMAL
GESTATIONAL AGE ON COLLECTION DATE: 16 WEEKS
INSULIN DEP DIABETES: NO
MATERNAL AGE AT EDD AFP: 32.1 YR
MULTIPLE GESTATION: NO
OSBR RISK 1 IN: NORMAL
RACE: NORMAL
WEIGHT AFP: 150 LBS

## 2023-02-15 LAB
CHROMOSOME13 INTERPRETATION: NORMAL
CHROMOSOME13 TEST RESULT: NORMAL
CHROMOSOME18 INTERPRETATION: NORMAL
CHROMOSOME18 TEST RESULT: NORMAL
CHROMOSOME21 INTERPRETATION: NORMAL
CHROMOSOME21 TEST RESULT: NORMAL
FETAL FRACTION: NORMAL
PERFORMANCE AND LIMITATIONS: NORMAL
SEX CHROMOSOME INTERPRETATION: NORMAL
SEX CHROMOSOME TEST RESULT: NORMAL
VERIFI PRENATAL TEST: NOT DETECTED

## 2023-03-06 ENCOUNTER — NON-APPOINTMENT (OUTPATIENT)
Age: 33
End: 2023-03-06

## 2023-03-07 ENCOUNTER — APPOINTMENT (OUTPATIENT)
Dept: OBGYN | Facility: CLINIC | Age: 33
End: 2023-03-07
Payer: COMMERCIAL

## 2023-03-07 VITALS
HEIGHT: 62 IN | WEIGHT: 150 LBS | DIASTOLIC BLOOD PRESSURE: 70 MMHG | BODY MASS INDEX: 27.6 KG/M2 | SYSTOLIC BLOOD PRESSURE: 110 MMHG

## 2023-03-07 PROCEDURE — 0502F SUBSEQUENT PRENATAL CARE: CPT

## 2023-03-10 ENCOUNTER — TRANSCRIPTION ENCOUNTER (OUTPATIENT)
Age: 33
End: 2023-03-10

## 2023-03-13 ENCOUNTER — APPOINTMENT (OUTPATIENT)
Dept: ANTEPARTUM | Facility: CLINIC | Age: 33
End: 2023-03-13
Payer: COMMERCIAL

## 2023-03-13 ENCOUNTER — ASOB RESULT (OUTPATIENT)
Age: 33
End: 2023-03-13

## 2023-03-13 PROCEDURE — 76817 TRANSVAGINAL US OBSTETRIC: CPT | Mod: 59

## 2023-03-13 PROCEDURE — 76811 OB US DETAILED SNGL FETUS: CPT

## 2023-03-20 ENCOUNTER — NON-APPOINTMENT (OUTPATIENT)
Age: 33
End: 2023-03-20

## 2023-03-28 ENCOUNTER — ASOB RESULT (OUTPATIENT)
Age: 33
End: 2023-03-28

## 2023-03-28 ENCOUNTER — APPOINTMENT (OUTPATIENT)
Dept: ANTEPARTUM | Facility: CLINIC | Age: 33
End: 2023-03-28
Payer: COMMERCIAL

## 2023-03-28 PROCEDURE — 76816 OB US FOLLOW-UP PER FETUS: CPT

## 2023-04-04 ENCOUNTER — APPOINTMENT (OUTPATIENT)
Dept: ENDOCRINOLOGY | Facility: CLINIC | Age: 33
End: 2023-04-04
Payer: COMMERCIAL

## 2023-04-04 VITALS
HEIGHT: 62 IN | HEART RATE: 89 BPM | SYSTOLIC BLOOD PRESSURE: 100 MMHG | BODY MASS INDEX: 28.34 KG/M2 | DIASTOLIC BLOOD PRESSURE: 64 MMHG | OXYGEN SATURATION: 99 % | WEIGHT: 154 LBS

## 2023-04-04 PROCEDURE — 99215 OFFICE O/P EST HI 40 MIN: CPT

## 2023-04-05 ENCOUNTER — APPOINTMENT (OUTPATIENT)
Dept: OBGYN | Facility: CLINIC | Age: 33
End: 2023-04-05
Payer: COMMERCIAL

## 2023-04-05 ENCOUNTER — RX RENEWAL (OUTPATIENT)
Age: 33
End: 2023-04-05

## 2023-04-05 VITALS
DIASTOLIC BLOOD PRESSURE: 74 MMHG | SYSTOLIC BLOOD PRESSURE: 116 MMHG | BODY MASS INDEX: 29.08 KG/M2 | HEIGHT: 62 IN | WEIGHT: 158 LBS

## 2023-04-05 DIAGNOSIS — N91.1 SECONDARY AMENORRHEA: ICD-10-CM

## 2023-04-05 DIAGNOSIS — Z86.19 PERSONAL HISTORY OF OTHER INFECTIOUS AND PARASITIC DISEASES: ICD-10-CM

## 2023-04-05 DIAGNOSIS — Z34.91 ENCOUNTER FOR SUPERVISION OF NORMAL PREGNANCY, UNSPECIFIED, FIRST TRIMESTER: ICD-10-CM

## 2023-04-05 DIAGNOSIS — Z3A.11 11 WEEKS GESTATION OF PREGNANCY: ICD-10-CM

## 2023-04-05 PROCEDURE — 0502F SUBSEQUENT PRENATAL CARE: CPT

## 2023-04-10 NOTE — PHYSICAL EXAM
[Alert] : alert [Well Nourished] : well nourished [No Acute Distress] : no acute distress [Well Developed] : well developed [Normal Sclera/Conjunctiva] : normal sclera/conjunctiva [EOMI] : extra ocular movement intact [No Proptosis] : no proptosis [Normal Oropharynx] : the oropharynx was normal [Thyroid Not Enlarged] : the thyroid was not enlarged [No Thyroid Nodules] : no palpable thyroid nodules [No Respiratory Distress] : no respiratory distress [No Accessory Muscle Use] : no accessory muscle use [Clear to Auscultation] : lungs were clear to auscultation bilaterally [Normal S1, S2] : normal S1 and S2 [Normal Rate] : heart rate was normal [Regular Rhythm] : with a regular rhythm [No Edema] : no peripheral edema [Pedal Pulses Normal] : the pedal pulses are present [Normal Bowel Sounds] : normal bowel sounds [Not Tender] : non-tender [Not Distended] : not distended [Soft] : abdomen soft [Normal Anterior Cervical Nodes] : no anterior cervical lymphadenopathy [No Spinal Tenderness] : no spinal tenderness [Spine Straight] : spine straight [No Stigmata of Cushings Syndrome] : no stigmata of Cushings Syndrome [Normal Gait] : normal gait [Normal Strength/Tone] : muscle strength and tone were normal [No Rash] : no rash [Acne] : acne present [No Tremors] : no tremors [Oriented x3] : oriented to person, place, and time [Normal Affect] : the affect was normal [Normal Insight/Judgement] : insight and judgment were intact [Abdominal Striae] : no abdominal striae

## 2023-04-10 NOTE — ADDENDUM
[FreeTextEntry1] : tentative L&D plan:\par IV hydrocortisone 100 mg when she goes into labor then 50 mg q6h until delivery/24 hours after delivery

## 2023-04-10 NOTE — HISTORY OF PRESENT ILLNESS
[FreeTextEntry1] : 33 yo F with non-classic CAH and prior hyperprolactinemia here for follow up.\par \par Currently 24 weeks \par tammi \par  week before, will be delivering at [facility] \par Feeling well \par baby is doing well, anatomy scan was good \par  negative for CAH genetic testing \par --\par \par CAH was diagnosed at age 5 years old. Patient started to have hair growth, breast development came early, growth was fast. At that time, she had signs of androgenization. She was on a "daily medication" until 9 or 10 years old and then she came off of her medication. Patient was evaluated by an endocrine doctor but was a loss to follow up. \par Menarche at 10-12 year old, was on birth control from 17 years old until 20 years for a diagnosis of ovarian cysts. Patient came off of birth control pill due to chronic yeast/bacterial infections so she just stopped the OCP. Then one year after stopping OCP she got pregnant. No history of vaginoplasty, no ambiguous genitalia history. \par Tried OCPs in past, several times, but had infections. IUD x 2 got imbedded. Currently utilizing condoms. She attempted birth control again but developed infections so declines further birth control pills\par Patient had lost 12 pounds with diet and exercise.\par She has cramps and bloating prior to period\par  she did IVF and got pregnant with twins in , unfortunately she miscarried at week 14. She was on steroids until one month into pregnancy. Has an 8 year old son, was not on steroids for that pregnancy nor before.\par \par currently on prednisone 5 mg QHS. \par \par Bothersome facial hair growth, facial acne.\par \par Had breast milk discharge in , none since. No nipple stimulation. No visual field cuts. Not on any anti-psychotic agents, just Xanax as needed for anxiety and depression. No new meds. She has a hx of hyperPRl s/p pregnancy of her son 7 years ago. She only  him for 2-3 months but continued to make milk.Last level was normal. \par Pituitary MRI 2019 did NOT reveal pituitary pathology, no pituitary adenoma identified\par \par 2020 labs revealed negative salivary cortisol and normal prolactin levels, DHEAS 524, improved androstenedione levels and 17 OHP in normative ranges.\par \par Sister also with NCAH and pituitary cyst.

## 2023-04-10 NOTE — ASSESSMENT
[FreeTextEntry1] : 31 yo F with non-classic CAH and prior hyperprolactinemia here for follow up.\par \par 1. NCCAH\par - Her main symptoms have been acne and hirsutism. Previously discussed options including steroids, spironolactone and OCP. Pt has had bad experience with OCP and prefers to not be on one. Now pregnant as above\par Discussed that glucocorticoids are indicated for women with NCCAH planning for pregnancy, it is a safe glucocorticoid (as opposed to dex which can cross placenta and cause fetal HPA axis suppression). during pregnancy, no need for lab monitoring as 17OHP and androgens go up in pregnancy, just follow clinically. Guidelines on glucocorticoid tx in NCCAH are vague - can continue low dose prednisone if used pre-conception whereas some clinicians will stop steroid once pt is pregnant. Pt has been on chronic steroids thus will continue pred 5 mg daily, discussed possible need for higher steroids as pregnancy progresses. Will need stress dose hydrocortisone for labor and delivery.\par -continue prednisone 5 mg daily, feels well\par -recommend bone density for baseline given chronic steroid use --> after pregnancy\par -adrenal imaging in 2019 revealed no abnormal adrenal pathology\par -pt prefers to continue chronic GC treatment, feels unwell off of it. Discussed risks of osteoporosis and tertiary adrenal insufficiency/steroid dependence\par \par 2. Hyperprolactinemia\par -repeat prolactin and macroprolactin levels as needed (after pregnancy)\par -no pituitary adenoma on imaging; surveillance imaging if prolactin levels increase\par \par RTC 3-4 months\par \par

## 2023-04-11 ENCOUNTER — NON-APPOINTMENT (OUTPATIENT)
Age: 33
End: 2023-04-11

## 2023-04-11 ENCOUNTER — OUTPATIENT (OUTPATIENT)
Dept: OUTPATIENT SERVICES | Facility: HOSPITAL | Age: 33
LOS: 1 days | End: 2023-04-11
Payer: COMMERCIAL

## 2023-04-11 VITALS
TEMPERATURE: 98 F | HEART RATE: 64 BPM | SYSTOLIC BLOOD PRESSURE: 121 MMHG | DIASTOLIC BLOOD PRESSURE: 55 MMHG | RESPIRATION RATE: 18 BRPM

## 2023-04-11 VITALS — HEART RATE: 73 BPM | SYSTOLIC BLOOD PRESSURE: 103 MMHG | DIASTOLIC BLOOD PRESSURE: 63 MMHG

## 2023-04-11 DIAGNOSIS — O47.02 FALSE LABOR BEFORE 37 COMPLETED WEEKS OF GESTATION, SECOND TRIMESTER: ICD-10-CM

## 2023-04-11 PROCEDURE — 36415 COLL VENOUS BLD VENIPUNCTURE: CPT

## 2023-04-11 PROCEDURE — 76815 OB US LIMITED FETUS(S): CPT

## 2023-04-11 PROCEDURE — 87491 CHLMYD TRACH DNA AMP PROBE: CPT

## 2023-04-11 PROCEDURE — 59025 FETAL NON-STRESS TEST: CPT

## 2023-04-11 PROCEDURE — 99213 OFFICE O/P EST LOW 20 MIN: CPT | Mod: GC

## 2023-04-11 PROCEDURE — 59050 FETAL MONITOR W/REPORT: CPT

## 2023-04-11 PROCEDURE — 87591 N.GONORRHOEAE DNA AMP PROB: CPT

## 2023-04-11 PROCEDURE — G0463: CPT

## 2023-04-11 PROCEDURE — 87800 DETECT AGNT MULT DNA DIREC: CPT

## 2023-04-11 NOTE — OB PROVIDER TRIAGE NOTE - NSOBPROVIDERNOTE_OBGYN_ALL_OB_FT
A/P: JOANA AGUIRRE is a 32y  at 25 weeks GA who presents to L&D for decreased fetal movements that has since resolve with a reassuring FHT.     Fetus:  [place]:  Dispo: Continue to observe.     Discussed with  JOANA AGUIRRE is a 32y  at 25 weeks GA who presents to L&D for decreased fetal movements.    -VSS  -NST reactive  -BPP   -Terconazole for suspected yeast infection; will follow up on culture  -Discussed kick counts and return precautions  -Follow up with Dr. Stack as scheduled    D/w Dr. Woodward

## 2023-04-11 NOTE — OB RN TRIAGE NOTE - NS_TRIAGEADDITIONAL COMMENTS_OBGYN_ALL_OB_FT
Patient c/o vaginal itching and discharge as well. Dr. Saavedra at bedside, cultures obtained. Bedside sonogram performed. Patient c/o vaginal itching and discharge as well. Dr. Saavedra at bedside, cultures obtained. Bedside sonogram performed.  1300-Sonogram performed by Dr. Saavedra, reviewed by Dr. Woodward with NST. Patient discharged home with instructions. Patient verbalized understanding as explained by Dr. Woodward.

## 2023-04-11 NOTE — OB PROVIDER TRIAGE NOTE - ATTENDING COMMENTS
32y  at 25 weeks GA who presents to L&D for decreased fetal movements. She reports feeling only 2 kicks in the past 3 days. 10 minutes after being on the monitor, she reports feeling normal fetal movements in the spot where the monitor is. Pt denies vaginal bleeding, contractions and leakage of fluid. Reports some vaginal itching; feels like yeast infection per patient.  HR: 64 (23 @ 11:47) (64 - 64)  BP: 121/55 (23 @ 11:47) (121/55 - 121/55)  FHT - reassuring for GA  [place] - no contractions  SSE: cervix visualized, closed and without any signs of bleeding or drainage, no pooling   Sono- + fetal movement  33 y/o  @ 25wks with decrease fetal movements and symptoms of yeast infection   - DFM - maternal and fetal status reassuring, likely change in fetal position   - yeast infection - pt with recurrent yeast infections and feels symptoms coming on. Took diflucan yesterday and culture sent. Terazol cream sent for PPX  - pt has appt in office 5/3 - plan d/c home and f/u as scheduled    Barbara Woodward MD

## 2023-04-11 NOTE — OB RN TRIAGE NOTE - FALL HARM RISK - RISK INTERVENTIONS

## 2023-04-11 NOTE — OB PROVIDER TRIAGE NOTE - HISTORY OF PRESENT ILLNESS
JOANA AGUIRRE is a 32y  at 25 weeks GA who presents to L&D for decreased fetal movements. She reports feeling only 2 kicks in the past 3 days. 10 minutes after being on the monitor, she reports feeling normal fetal movements in the spot where the monitor is. Pt denies vaginal bleeding, contractions and leakage of fluid. Pt denies trauma. She denies any urinary complaints. She denies fevers, chills, nausea, vomiting. She denies shortness of breath, chest pain, and palpitations.    Pregnancy course is  uncomplicated.     POB: G1 2013 CS at 37 weeks for "poor fetal oxygenation"  PGYN: -fibroids/+cysts treated with OCPs, denied STD hx, denies abnormal PAPs  PMH: Congenital adrenal hyperplasia  PSH: CS   SH: Denies tobacco use, EtOH use and illicit drug use during the pregnancy; Feels safe at home  Meds: Prednisone, PNVs  All: denies   JOANA AGUIRRE is a 32y  at 25 weeks GA who presents to L&D for decreased fetal movements. She reports feeling only 2 kicks in the past 3 days. 10 minutes after being on the monitor, she reports feeling normal fetal movements in the spot where the monitor is. Pt denies vaginal bleeding, contractions and leakage of fluid. Reports some vaginal itching; feels like yeast infection per patient. She denies any urinary complaints. She denies fevers, chills, nausea, vomiting. She denies shortness of breath, chest pain, and palpitations.    Pregnancy course is  uncomplicated.     POB: G1  CS at 37 weeks for "poor fetal oxygenation"  PGYN: -fibroids/+cysts treated with OCPs, denied STD hx, denies abnormal PAPs  PMH: Congenital adrenal hyperplasia  PSH: CS   SH: Denies tobacco use, EtOH use and illicit drug use during the pregnancy; Feels safe at home  Meds: Prednisone, PNVs  All: denies

## 2023-04-11 NOTE — OB PROVIDER TRIAGE NOTE - NSHPPHYSICALEXAM_GEN_ALL_CORE
T(C): 36.5 (04-11-23 @ 11:45), Max: 36.5 (04-11-23 @ 11:45)  HR: 64 (04-11-23 @ 11:47) (64 - 64)  BP: 121/55 (04-11-23 @ 11:47) (121/55 - 121/55)  RR: 18 (04-11-23 @ 11:45) (18 - 18)  Gen: NAD, well-appearing  Abd: soft, gravid  Ext: non-edematous, non-tender   SSE: cervix visualized, closed and without any signs of bleeding or drainage, no pooling   FHT:   [place]: T(C): 36.5 (04-11-23 @ 11:45), Max: 36.5 (04-11-23 @ 11:45)  HR: 64 (04-11-23 @ 11:47) (64 - 64)  BP: 121/55 (04-11-23 @ 11:47) (121/55 - 121/55)  RR: 18 (04-11-23 @ 11:45) (18 - 18)  Gen: NAD, well-appearing  Abd: soft, gravid  Ext: non-edematous, non-tender   SSE: cervix visualized, closed and without any signs of bleeding or drainage, no pooling   FHT: 145bpm baseline, moderate variability, + accelerations, no decelerations  Sparkman:a contracitle

## 2023-04-12 ENCOUNTER — TRANSCRIPTION ENCOUNTER (OUTPATIENT)
Age: 33
End: 2023-04-12

## 2023-04-12 LAB
C TRACH RRNA SPEC QL NAA+PROBE: SIGNIFICANT CHANGE UP
CANDIDA AB TITR SER: SIGNIFICANT CHANGE UP
G VAGINALIS DNA SPEC QL NAA+PROBE: SIGNIFICANT CHANGE UP
N GONORRHOEA RRNA SPEC QL NAA+PROBE: SIGNIFICANT CHANGE UP
T VAGINALIS SPEC QL WET PREP: SIGNIFICANT CHANGE UP

## 2023-04-18 ENCOUNTER — NON-APPOINTMENT (OUTPATIENT)
Age: 33
End: 2023-04-18

## 2023-04-19 ENCOUNTER — NON-APPOINTMENT (OUTPATIENT)
Age: 33
End: 2023-04-19

## 2023-04-19 DIAGNOSIS — M54.9 DORSALGIA, UNSPECIFIED: ICD-10-CM

## 2023-04-19 DIAGNOSIS — M54.30 DORSALGIA, UNSPECIFIED: ICD-10-CM

## 2023-04-25 LAB
BASOPHILS # BLD AUTO: 0.02 K/UL
BASOPHILS NFR BLD AUTO: 0.2 %
EOSINOPHIL # BLD AUTO: 0.04 K/UL
EOSINOPHIL NFR BLD AUTO: 0.4 %
GLUCOSE 1H P 50 G GLC PO SERPL-MCNC: 97 MG/DL
HCT VFR BLD CALC: 36.4 %
HGB BLD-MCNC: 11.7 G/DL
IMM GRANULOCYTES NFR BLD AUTO: 1.1 %
LYMPHOCYTES # BLD AUTO: 1.87 K/UL
LYMPHOCYTES NFR BLD AUTO: 17.2 %
MAN DIFF?: NORMAL
MCHC RBC-ENTMCNC: 29.9 PG
MCHC RBC-ENTMCNC: 32.1 GM/DL
MCV RBC AUTO: 93.1 FL
MONOCYTES # BLD AUTO: 0.65 K/UL
MONOCYTES NFR BLD AUTO: 6 %
NEUTROPHILS # BLD AUTO: 8.2 K/UL
NEUTROPHILS NFR BLD AUTO: 75.1 %
PLATELET # BLD AUTO: 249 K/UL
RBC # BLD: 3.91 M/UL
RBC # FLD: 13.2 %
WBC # FLD AUTO: 10.9 K/UL

## 2023-05-02 ENCOUNTER — NON-APPOINTMENT (OUTPATIENT)
Age: 33
End: 2023-05-02

## 2023-05-03 ENCOUNTER — APPOINTMENT (OUTPATIENT)
Dept: OBGYN | Facility: CLINIC | Age: 33
End: 2023-05-03
Payer: COMMERCIAL

## 2023-05-03 VITALS
WEIGHT: 165 LBS | HEIGHT: 62 IN | BODY MASS INDEX: 30.36 KG/M2 | SYSTOLIC BLOOD PRESSURE: 110 MMHG | DIASTOLIC BLOOD PRESSURE: 70 MMHG

## 2023-05-03 PROCEDURE — 0502F SUBSEQUENT PRENATAL CARE: CPT

## 2023-05-03 PROCEDURE — 90715 TDAP VACCINE 7 YRS/> IM: CPT

## 2023-05-03 PROCEDURE — 90471 IMMUNIZATION ADMIN: CPT

## 2023-05-25 ENCOUNTER — APPOINTMENT (OUTPATIENT)
Dept: OBGYN | Facility: CLINIC | Age: 33
End: 2023-05-25
Payer: COMMERCIAL

## 2023-05-25 ENCOUNTER — NON-APPOINTMENT (OUTPATIENT)
Age: 33
End: 2023-05-25

## 2023-05-25 VITALS
WEIGHT: 171 LBS | DIASTOLIC BLOOD PRESSURE: 62 MMHG | SYSTOLIC BLOOD PRESSURE: 106 MMHG | HEIGHT: 62 IN | BODY MASS INDEX: 31.47 KG/M2

## 2023-05-25 PROCEDURE — 0502F SUBSEQUENT PRENATAL CARE: CPT

## 2023-06-13 ENCOUNTER — APPOINTMENT (OUTPATIENT)
Dept: ENDOCRINOLOGY | Facility: CLINIC | Age: 33
End: 2023-06-13
Payer: COMMERCIAL

## 2023-06-13 PROCEDURE — 99215 OFFICE O/P EST HI 40 MIN: CPT | Mod: 95

## 2023-06-16 ENCOUNTER — APPOINTMENT (OUTPATIENT)
Dept: ANTEPARTUM | Facility: CLINIC | Age: 33
End: 2023-06-16
Payer: COMMERCIAL

## 2023-06-16 ENCOUNTER — APPOINTMENT (OUTPATIENT)
Dept: OBGYN | Facility: CLINIC | Age: 33
End: 2023-06-16
Payer: COMMERCIAL

## 2023-06-16 ENCOUNTER — ASOB RESULT (OUTPATIENT)
Age: 33
End: 2023-06-16

## 2023-06-16 VITALS
DIASTOLIC BLOOD PRESSURE: 70 MMHG | HEIGHT: 62 IN | SYSTOLIC BLOOD PRESSURE: 110 MMHG | WEIGHT: 175 LBS | BODY MASS INDEX: 32.2 KG/M2

## 2023-06-16 DIAGNOSIS — Z34.92 ENCOUNTER FOR SUPERVISION OF NORMAL PREGNANCY, UNSPECIFIED, SECOND TRIMESTER: ICD-10-CM

## 2023-06-16 PROCEDURE — 0502F SUBSEQUENT PRENATAL CARE: CPT

## 2023-06-16 PROCEDURE — 76819 FETAL BIOPHYS PROFIL W/O NST: CPT | Mod: 59

## 2023-06-16 PROCEDURE — 76816 OB US FOLLOW-UP PER FETUS: CPT

## 2023-06-16 RX ORDER — VALACYCLOVIR 500 MG/1
500 TABLET, FILM COATED ORAL DAILY
Qty: 60 | Refills: 0 | Status: ACTIVE | COMMUNITY
Start: 2023-06-16 | End: 1900-01-01

## 2023-06-16 NOTE — REASON FOR VISIT
[Follow - Up] : a follow-up visit [Other___] : [unfilled] [Home] : at home, [unfilled] , at the time of the visit. [Medical Office: ([facility])___] : at the medical office located in  [Patient] : the patient [Self] : self

## 2023-06-16 NOTE — ASSESSMENT
[FreeTextEntry1] : 31 yo F with non-classic CAH and prior hyperprolactinemia here for follow up.\par 34 weeks\par c section planned 7/20/23\par \par Steroid plan for c/section\par 1)	IV hydrocortisone 100 mg x 1 dose administered prior to c/section\par 2)	Continue IV hydrocortisone 50 mg q6h for 24 hours following c/section\par 3)	Can reduce to PO hydrocortisone 25 mg q8h x 1 day (IV if not able to tolerate PO is fine)\par 4)	Followed by PO hydrocortisone 25 mg q12h x 1 day (if still in hospital)\par 5)	Resume PO prednisone 5 mg for discharge \par \par Discussed that corticosteroids are ok in breastfeeding at low doses, prednisone is one of the oral corticosteroids preferred for use in breastfeeding women. Can try to space feeds if able to 2-4 hrs after maternal dose of prednisone is taken to reduce potential exposure to the child.\par \par Sent email to pt's main OB with information above.\par \par 1. NCCAH\par - Her main symptoms have been acne and hirsutism. Previously discussed options including steroids, spironolactone and OCP. Pt has had bad experience with OCP and prefers to not be on one. Now pregnant as above\par Discussed that glucocorticoids are indicated for women with NCCAH planning for pregnancy, it is a safe glucocorticoid (as opposed to dex which can cross placenta and cause fetal HPA axis suppression). during pregnancy, no need for lab monitoring as 17OHP and androgens go up in pregnancy, just follow clinically. Guidelines on glucocorticoid tx in NCCAH are vague - can continue low dose prednisone if used pre-conception whereas some clinicians will stop steroid once pt is pregnant. Pt has been on chronic steroids thus continued pred 5 mg daily, has not needed increased steroid dosing at end of pregnancy. Will need stress dose hydrocortisone for labor and delivery.\par -continue prednisone 5 mg daily\par -recommend bone density for baseline given chronic steroid use --> after pregnancy\par -adrenal imaging in 2019 revealed no abnormal adrenal pathology\par -pt prefers to continue chronic GC treatment, feels unwell off of it. Discussed risks of osteoporosis and tertiary adrenal insufficiency/steroid dependence\par \par 2. Hyperprolactinemia\par -repeat prolactin and macroprolactin levels as needed (after pregnancy)\par -no pituitary adenoma on imaging; surveillance imaging if prolactin levels increase\par \par RTC 3-4 months\par \par

## 2023-06-16 NOTE — HISTORY OF PRESENT ILLNESS
[FreeTextEntry1] : 31 yo F with non-classic CAH and prior hyperprolactinemia here for follow up.\par \par Currently 34 weeks \par Prednisone 5 mg \par  negative for CAH genetic testing \par will be trying to breast feed \par --\par \par CAH was diagnosed at age 5 years old. Patient started to have hair growth, breast development came early, growth was fast. At that time, she had signs of androgenization. She was on a "daily medication" until 9 or 10 years old and then she came off of her medication. Patient was evaluated by an endocrine doctor but was a loss to follow up. \par Menarche at 10-11 year old, was on birth control from 17 years old until 20 years for a diagnosis of ovarian cysts. Patient came off of birth control pill due to chronic yeast/bacterial infections so she just stopped the OCP. Then one year after stopping OCP she got pregnant. No history of vaginoplasty, no ambiguous genitalia history. \par Tried OCPs in past, several times, but had infections. IUD x 2 got imbedded. Currently utilizing condoms. She attempted birth control again but developed infections so declines further birth control pills\par Patient had lost 12 pounds with diet and exercise.\par She has cramps and bloating prior to period\par  she did IVF and got pregnant with twins in 2021, unfortunately she miscarried at week 14. She was on steroids until one month into pregnancy. Has an 8 year old son, was not on steroids for that pregnancy nor before.\par \par currently on prednisone 5 mg QHS. \par \par Bothersome facial hair growth, facial acne.\par \par Had breast milk discharge in 2020, none since. No nipple stimulation. No visual field cuts. Not on any anti-psychotic agents, just Xanax as needed for anxiety and depression. No new meds. She has a hx of hyperPRl s/p pregnancy of her son 7 years ago. She only  him for 2-3 months but continued to make milk.Last level was normal. \par Pituitary MRI August 2019 did NOT reveal pituitary pathology, no pituitary adenoma identified\par \par February 2020 labs revealed negative salivary cortisol and normal prolactin levels, DHEAS 524, improved androstenedione levels and 17 OHP in normative ranges.\par \par Sister also with NCAH and pituitary cyst.

## 2023-06-28 ENCOUNTER — APPOINTMENT (OUTPATIENT)
Dept: OBGYN | Facility: CLINIC | Age: 33
End: 2023-06-28
Payer: COMMERCIAL

## 2023-06-28 VITALS
BODY MASS INDEX: 32.57 KG/M2 | DIASTOLIC BLOOD PRESSURE: 80 MMHG | HEIGHT: 62 IN | WEIGHT: 177 LBS | SYSTOLIC BLOOD PRESSURE: 132 MMHG

## 2023-06-28 PROCEDURE — 0502F SUBSEQUENT PRENATAL CARE: CPT

## 2023-06-30 LAB
GP B STREP DNA SPEC QL NAA+PROBE: NOT DETECTED
HIV1+2 AB SPEC QL IA.RAPID: NONREACTIVE
SOURCE GBS: NORMAL
T PALLIDUM AB SER QL IA: NEGATIVE

## 2023-07-03 ENCOUNTER — NON-APPOINTMENT (OUTPATIENT)
Age: 33
End: 2023-07-03

## 2023-07-03 ENCOUNTER — APPOINTMENT (OUTPATIENT)
Dept: OBGYN | Facility: CLINIC | Age: 33
End: 2023-07-03
Payer: COMMERCIAL

## 2023-07-03 VITALS
WEIGHT: 178 LBS | HEIGHT: 62 IN | SYSTOLIC BLOOD PRESSURE: 120 MMHG | DIASTOLIC BLOOD PRESSURE: 70 MMHG | BODY MASS INDEX: 32.76 KG/M2

## 2023-07-03 PROCEDURE — 0502F SUBSEQUENT PRENATAL CARE: CPT

## 2023-07-12 ENCOUNTER — TRANSCRIPTION ENCOUNTER (OUTPATIENT)
Age: 33
End: 2023-07-12

## 2023-07-13 ENCOUNTER — INPATIENT (INPATIENT)
Facility: HOSPITAL | Age: 33
LOS: 1 days | Discharge: HOME | End: 2023-07-15
Attending: OBSTETRICS & GYNECOLOGY | Admitting: OBSTETRICS & GYNECOLOGY
Payer: COMMERCIAL

## 2023-07-13 ENCOUNTER — APPOINTMENT (OUTPATIENT)
Dept: OBGYN | Facility: CLINIC | Age: 33
End: 2023-07-13
Payer: COMMERCIAL

## 2023-07-13 VITALS
WEIGHT: 178 LBS | SYSTOLIC BLOOD PRESSURE: 114 MMHG | BODY MASS INDEX: 32.76 KG/M2 | HEIGHT: 62 IN | DIASTOLIC BLOOD PRESSURE: 70 MMHG

## 2023-07-13 VITALS — SYSTOLIC BLOOD PRESSURE: 122 MMHG | DIASTOLIC BLOOD PRESSURE: 73 MMHG | HEART RATE: 78 BPM

## 2023-07-13 DIAGNOSIS — Z98.891 HISTORY OF UTERINE SCAR FROM PREVIOUS SURGERY: Chronic | ICD-10-CM

## 2023-07-13 DIAGNOSIS — O26.899 OTHER SPECIFIED PREGNANCY RELATED CONDITIONS, UNSPECIFIED TRIMESTER: ICD-10-CM

## 2023-07-13 LAB
BLD GP AB SCN SERPL QL: SIGNIFICANT CHANGE UP
HCT VFR BLD CALC: 37.3 % — SIGNIFICANT CHANGE UP (ref 34.5–45)
HGB BLD-MCNC: 12.6 G/DL — SIGNIFICANT CHANGE UP (ref 11.5–15.5)
MCHC RBC-ENTMCNC: 29.3 PG — SIGNIFICANT CHANGE UP (ref 27–34)
MCHC RBC-ENTMCNC: 33.8 GM/DL — SIGNIFICANT CHANGE UP (ref 32–36)
MCV RBC AUTO: 86.7 FL — SIGNIFICANT CHANGE UP (ref 80–100)
PLATELET # BLD AUTO: 233 K/UL — SIGNIFICANT CHANGE UP (ref 150–400)
RBC # BLD: 4.3 M/UL — SIGNIFICANT CHANGE UP (ref 3.8–5.2)
RBC # FLD: 14.4 % — SIGNIFICANT CHANGE UP (ref 10.3–14.5)
WBC # BLD: 13.89 K/UL — HIGH (ref 3.8–10.5)
WBC # FLD AUTO: 13.89 K/UL — HIGH (ref 3.8–10.5)

## 2023-07-13 PROCEDURE — 0502F SUBSEQUENT PRENATAL CARE: CPT

## 2023-07-13 PROCEDURE — 59510 CESAREAN DELIVERY: CPT

## 2023-07-13 PROCEDURE — 59025 FETAL NON-STRESS TEST: CPT

## 2023-07-13 RX ORDER — SIMETHICONE 80 MG/1
80 TABLET, CHEWABLE ORAL EVERY 4 HOURS
Refills: 0 | Status: DISCONTINUED | OUTPATIENT
Start: 2023-07-13 | End: 2023-07-15

## 2023-07-13 RX ORDER — FAMOTIDINE 10 MG/ML
20 INJECTION INTRAVENOUS ONCE
Refills: 0 | Status: COMPLETED | OUTPATIENT
Start: 2023-07-13 | End: 2023-07-13

## 2023-07-13 RX ORDER — DIPHENHYDRAMINE HCL 50 MG
25 CAPSULE ORAL EVERY 6 HOURS
Refills: 0 | Status: DISCONTINUED | OUTPATIENT
Start: 2023-07-13 | End: 2023-07-15

## 2023-07-13 RX ORDER — MAGNESIUM HYDROXIDE 400 MG/1
30 TABLET, CHEWABLE ORAL
Refills: 0 | Status: DISCONTINUED | OUTPATIENT
Start: 2023-07-13 | End: 2023-07-15

## 2023-07-13 RX ORDER — SODIUM CHLORIDE 9 MG/ML
1000 INJECTION, SOLUTION INTRAVENOUS
Refills: 0 | Status: DISCONTINUED | OUTPATIENT
Start: 2023-07-13 | End: 2023-07-15

## 2023-07-13 RX ORDER — KETOROLAC TROMETHAMINE 30 MG/ML
30 SYRINGE (ML) INJECTION EVERY 6 HOURS
Refills: 0 | Status: DISCONTINUED | OUTPATIENT
Start: 2023-07-13 | End: 2023-07-14

## 2023-07-13 RX ORDER — HYDROCORTISONE 20 MG
100 TABLET ORAL ONCE
Refills: 0 | Status: COMPLETED | OUTPATIENT
Start: 2023-07-13 | End: 2023-07-13

## 2023-07-13 RX ORDER — OXYTOCIN 10 UNIT/ML
333.33 VIAL (ML) INJECTION
Qty: 20 | Refills: 0 | Status: DISCONTINUED | OUTPATIENT
Start: 2023-07-13 | End: 2023-07-15

## 2023-07-13 RX ORDER — CITRIC ACID/SODIUM CITRATE 300-500 MG
30 SOLUTION, ORAL ORAL ONCE
Refills: 0 | Status: COMPLETED | OUTPATIENT
Start: 2023-07-13 | End: 2023-07-13

## 2023-07-13 RX ORDER — CEFAZOLIN SODIUM 1 G
2000 VIAL (EA) INJECTION ONCE
Refills: 0 | Status: DISCONTINUED | OUTPATIENT
Start: 2023-07-13 | End: 2023-07-13

## 2023-07-13 RX ORDER — TETANUS TOXOID, REDUCED DIPHTHERIA TOXOID AND ACELLULAR PERTUSSIS VACCINE, ADSORBED 5; 2.5; 8; 8; 2.5 [IU]/.5ML; [IU]/.5ML; UG/.5ML; UG/.5ML; UG/.5ML
0.5 SUSPENSION INTRAMUSCULAR ONCE
Refills: 0 | Status: DISCONTINUED | OUTPATIENT
Start: 2023-07-13 | End: 2023-07-15

## 2023-07-13 RX ORDER — IBUPROFEN 200 MG
600 TABLET ORAL EVERY 6 HOURS
Refills: 0 | Status: COMPLETED | OUTPATIENT
Start: 2023-07-13 | End: 2024-06-10

## 2023-07-13 RX ORDER — ACETAMINOPHEN 500 MG
975 TABLET ORAL
Refills: 0 | Status: DISCONTINUED | OUTPATIENT
Start: 2023-07-13 | End: 2023-07-15

## 2023-07-13 RX ORDER — OXYCODONE HYDROCHLORIDE 5 MG/1
5 TABLET ORAL ONCE
Refills: 0 | Status: DISCONTINUED | OUTPATIENT
Start: 2023-07-13 | End: 2023-07-15

## 2023-07-13 RX ORDER — ENOXAPARIN SODIUM 100 MG/ML
40 INJECTION SUBCUTANEOUS EVERY 24 HOURS
Refills: 0 | Status: DISCONTINUED | OUTPATIENT
Start: 2023-07-13 | End: 2023-07-15

## 2023-07-13 RX ORDER — LANOLIN
1 OINTMENT (GRAM) TOPICAL EVERY 6 HOURS
Refills: 0 | Status: DISCONTINUED | OUTPATIENT
Start: 2023-07-13 | End: 2023-07-15

## 2023-07-13 RX ORDER — HYDROCORTISONE 20 MG
50 TABLET ORAL EVERY 6 HOURS
Refills: 0 | Status: COMPLETED | OUTPATIENT
Start: 2023-07-14 | End: 2023-07-14

## 2023-07-13 RX ORDER — SCOPALAMINE 1 MG/3D
1 PATCH, EXTENDED RELEASE TRANSDERMAL ONCE
Refills: 0 | Status: COMPLETED | OUTPATIENT
Start: 2023-07-13 | End: 2023-07-13

## 2023-07-13 RX ORDER — OXYCODONE HYDROCHLORIDE 5 MG/1
5 TABLET ORAL
Refills: 0 | Status: DISCONTINUED | OUTPATIENT
Start: 2023-07-13 | End: 2023-07-15

## 2023-07-13 RX ORDER — ACETAMINOPHEN 500 MG
975 TABLET ORAL ONCE
Refills: 0 | Status: COMPLETED | OUTPATIENT
Start: 2023-07-13 | End: 2023-07-13

## 2023-07-13 RX ORDER — HYDROCORTISONE 20 MG
25 TABLET ORAL EVERY 8 HOURS
Refills: 0 | Status: DISCONTINUED | OUTPATIENT
Start: 2023-07-15 | End: 2023-07-15

## 2023-07-13 RX ORDER — CEFAZOLIN SODIUM 1 G
2000 VIAL (EA) INJECTION ONCE
Refills: 0 | Status: COMPLETED | OUTPATIENT
Start: 2023-07-13 | End: 2023-07-13

## 2023-07-13 RX ORDER — SODIUM CHLORIDE 9 MG/ML
1000 INJECTION, SOLUTION INTRAVENOUS ONCE
Refills: 0 | Status: COMPLETED | OUTPATIENT
Start: 2023-07-13 | End: 2023-07-13

## 2023-07-13 RX ORDER — SODIUM CHLORIDE 9 MG/ML
1000 INJECTION, SOLUTION INTRAVENOUS
Refills: 0 | Status: DISCONTINUED | OUTPATIENT
Start: 2023-07-13 | End: 2023-07-13

## 2023-07-13 RX ADMIN — Medication 50 MILLIGRAM(S): at 23:11

## 2023-07-13 RX ADMIN — Medication 1000 MILLIUNIT(S)/MIN: at 18:03

## 2023-07-13 RX ADMIN — FAMOTIDINE 20 MILLIGRAM(S): 10 INJECTION INTRAVENOUS at 16:43

## 2023-07-13 RX ADMIN — Medication 100 MILLIGRAM(S): at 17:05

## 2023-07-13 RX ADMIN — Medication 2000 MILLIGRAM(S): at 17:40

## 2023-07-13 RX ADMIN — SCOPALAMINE 1 PATCH: 1 PATCH, EXTENDED RELEASE TRANSDERMAL at 16:43

## 2023-07-13 RX ADMIN — SODIUM CHLORIDE 125 MILLILITER(S): 9 INJECTION, SOLUTION INTRAVENOUS at 16:30

## 2023-07-13 RX ADMIN — Medication 30 MILLIGRAM(S): at 23:50

## 2023-07-13 RX ADMIN — Medication 975 MILLIGRAM(S): at 17:10

## 2023-07-13 RX ADMIN — SODIUM CHLORIDE 2000 MILLILITER(S): 9 INJECTION, SOLUTION INTRAVENOUS at 16:00

## 2023-07-13 RX ADMIN — Medication 975 MILLIGRAM(S): at 16:43

## 2023-07-13 RX ADMIN — SCOPALAMINE 1 PATCH: 1 PATCH, EXTENDED RELEASE TRANSDERMAL at 22:00

## 2023-07-13 RX ADMIN — Medication 30 MILLILITER(S): at 16:42

## 2023-07-13 RX ADMIN — Medication 975 MILLIGRAM(S): at 22:11

## 2023-07-13 NOTE — OB PROVIDER DELIVERY SUMMARY - NSPROVIDERDELIVERYNOTE_OBGYN_ALL_OB_FT
JOANA AGUIRRE is a 33y yo now  taken to the OR for scheduled  delivery due to labor. Upon receiving spinal anesthesisa, patient was prepped in sterile fashion.  Viable baby girl delivered in cephalic position through low transverse uterine hysterotomy. Clear amniotic fluid noted, nuchal cord not appreciated. After 30 seconds delayed cord clamping, baby was assessed then provided to parent for skin-to-skin. Hysterotomy reapproximated with 0 Monocryl suture in two layers, excellent hemostasis obtained. Uterus, fallopian tubes and ovaries noted to be grossly normal. Peritoneum closed with 3-0 Vicryl, rectus muscle reapproximated with 3-0 chromic, and fascia closed with 3-0 Vicryl, excellent hemostasis obtained. Skin incision closed with 1-0 Monocryl suture on a bereket and covered with steristrips and dressing. No intraoperative complications to note.     QBL: 572cc UOP: 150cc    Sex: F, Delivery Time: 1802,  weight: 3270g, APGAR: 9/9. JOANA AGUIRRE is a 33y yo now  taken to the OR for scheduled  delivery due to labor. Upon receiving spinal anesthesisa, patient was prepped in sterile fashion.  Viable baby girl delivered in cephalic position through low transverse uterine hysterotomy. Clear amniotic fluid noted, nuchal cord not appreciated. After 30 seconds delayed cord clamping, baby was assessed then provided to parent for skin-to-skin. Hysterotomy reapproximated with 0 Monocryl CTX in two layers, excellent hemostasis obtained. Uterus, fallopian tubes and ovaries noted to be grossly normal. Peritoneum closed with 3-0 Vicryl CT, rectus muscle reapproximated with 3-0 chromic, and fascia closed with 0 Vicryl, excellent hemostasis obtained. Subcutaneous tissue reapproximated with 2-0 plain CT, and skin incision closed with 4-0 vicryl on a bereket and covered with steristrips and dressing. No intraoperative complications to note.     QBL: 572cc UOP: 150cc  Sex: F, Delivery Time: 1802,  weight: 3270g, APGAR: 9/9. JOANA AGUIRRE is a 33y yo now  taken to the OR for scheduled  delivery due to labor. Upon receiving spinal anesthesisa, patient was prepped in sterile fashion.  Viable baby girl delivered in cephalic position through low transverse uterine hysterotomy. Clear amniotic fluid noted, nuchal cord not appreciated. After 30 seconds delayed cord clamping, baby was assessed then provided to parent for skin-to-skin. Hysterotomy reapproximated with 0 Monocryl CTX in two layers, excellent hemostasis obtained. Uterus, fallopian tubes and ovaries noted to be grossly normal. Peritoneum closed with 3-0 Vicryl CT, rectus muscle reapproximated with 3-0 chromic, and fascia closed with 0 Vicryl, excellent hemostasis obtained. Subcutaneous tissue reapproximated with 2-0 plain CT, and skin incision closed with 4-0 vicryl on a bereket and covered with steristrips and dressing. No intraoperative complications to note.     QBL: 572cc UOP: 150cc  Sex: F, Delivery Time: 1802,  weight: 3270g, APGAR: 9/9.  Dictation#[id number] JOANA AGUIRRE is a 33y yo now  taken to the OR for scheduled  delivery due to labor. Upon receiving spinal anesthesisa, patient was prepped in sterile fashion.  Viable baby girl delivered in cephalic position through low transverse uterine hysterotomy. Clear amniotic fluid noted, nuchal cord not appreciated. After 30 seconds delayed cord clamping, baby was assessed then provided to parent for skin-to-skin. Hysterotomy reapproximated with 0 Monocryl CTX in two layers, excellent hemostasis obtained. Uterus, fallopian tubes and ovaries noted to be grossly normal. Peritoneum closed with 3-0 Vicryl CT, rectus muscle reapproximated with 2-0 plain, and fascia closed with 0 Vicryl, excellent hemostasis obtained. Subcutaneous tissue reapproximated with 2-0 plain CT, and skin incision closed with 4-0 vicryl on a bereket and covered with steristrips and dressing. No intraoperative complications to note.     QBL: 572cc UOP: 150cc  Sex: F, Delivery Time: 1802,  weight: 3270g, APGAR: 9/9.  Dictation#[id number]

## 2023-07-13 NOTE — OB RN DELIVERY SUMMARY - NS_SEPSISRSKCALC_OBGYN_ALL_OB_FT
EOS calculated successfully. EOS Risk Factor: 0.5/1000 live births ([facility] national incidence); GA=38w2d; Temp=97.5; ROM=0.017; GBS='Negative'; Antibiotics='No antibiotics or any antibiotics < 2 hrs prior to birth'

## 2023-07-13 NOTE — OB RN DELIVERY SUMMARY - NSSELHIDDEN_OBGYN_ALL_OB_FT
[[email or web address]_OB_FT:[email or web address]],[NS_DeliveryAssist1_OBGYN_ALL_OB_FT:[email or web address]=],[NS_DeliveryRN_OBGYN_ALL_OB_FT:[id number]]

## 2023-07-13 NOTE — OB PROVIDER DELIVERY SUMMARY - NSLOWPPHRISK_OBGYN_A_OB
[place] Pregnancy/Less than or equal to 4 previous vaginal births/No known bleeding disorder/No history of postpartum hemorrhage/No other PPH risks indicated

## 2023-07-13 NOTE — OB PROVIDER H&P - HISTORY OF PRESENT ILLNESS
33y  at 38 weeks GA by LMP consistent with trimester sono who presents to L&D for labor.   LIZ: 23  LMP: 10/2/22  Prenatal course is significant for: Herpes on valtrex, CAH on prednisone    Patient denies vaginal bleeding, contractions and leakage of fluid. She endorses good fetal movement. Denies fevers, chills, nausea and vomiting. No other complaints at this time. Patient was seen in office and was found to be 1 cm dilated, was sent to [facility] for [facility].    POB: pCS @ 37 w NRFHT  PGYN: -fibroids, -ovarian cysts, denies STD hx, denies abnormal PAPs   PMH: CAH, herpes  PSH: Denies  SH: Denies EtOH, tobacco and illicit drug use during this pregnancy; feels safe at home   Meds: PNVs, valtrex, prednisone  Allergies: NKDA    BMI:32.5  Sono:Vertex               33y  at 38 weeks GA by LMP consistent with trimester sono who presents to L&D with contractions every 5 minutes form office.  SVE in office: closed    LIZ: 23  LMP: 10/2/22  Prenatal course is significant for: Hx of HSV on valtrex PPx, CAH on prednisone    Patient denies vaginal bleeding, contractions and leakage of fluid. She endorses good fetal movement. Denies fevers, chills, nausea and vomiting. No other complaints at this time. Patient was seen in office and was found to be 1 cm dilated, was sent to [facility] for evaluation.    POB: pCS @ 37 w NRFHT  PGYN: -fibroids, -ovarian cysts, denies STD hx, denies abnormal PAPs   PMH: CAH, herpes  PSH: Denies  SH: Denies EtOH, tobacco and illicit drug use during this pregnancy; feels safe at home   Meds: PNVs, valtrex, prednisone  Allergies: NKDA    BMI:32.5  Sono:Vertex               33y  at 38 weeks 2 days GA by LMP 2023 consistent with trimester sono who presents to L&D with contractions every 5 minutes form office.  SVE in office: closed    LIZ: 23  LMP: 10/18/22  Prenatal course is significant for: Hx of HSV on valtrex PPx, CAH on prednisone    Patient denies vaginal bleeding, contractions and leakage of fluid. She endorses good fetal movement. Denies fevers, chills, nausea and vomiting. No other complaints at this time. Patient was seen in office and was found to be 1 cm dilated, was [facility] to [facility].    POB: pCS @ 37 w NRFHT  PGYN: -fibroids, -ovarian cysts, denies STD hx, denies abnormal PAPs   PMH: CAH, herpes  PSH: Denies  SH: Denies EtOH, tobacco and illicit drug use during this pregnancy; feels safe at home   Meds: PNVs, valtrex, prednisone  Allergies: NKDA    BMI:32.5  Sono:Vertex

## 2023-07-13 NOTE — OB PROVIDER H&P - NSHPPHYSICALEXAM_GEN_ALL_CORE
HR: 78 (07-13-23 @ 15:44) (78 - 78)  BP: 122/73 (07-13-23 @ 15:44) (122/73 - 122/73)    Gen: NAD, well-appearing   Abd: Soft, gravid  Ext: non-tender, non-edematous  SVE:  2/80/-2    FHT: 130 bpm, moderate variability, -accels, -decels  [place]: Contractions q5 min

## 2023-07-13 NOTE — OB RN INTRAOPERATIVE NOTE - NSSELHIDDEN_OBGYN_ALL_OB_FT
[NS_[email or web address]_OB_FT:[email or web address]],[NS_DeliveryAssist1_OBGYN_ALL_OB_FT:[email or web address]=],[NS_DeliveryRN_OBGYN_ALL_OB_FT:[id number]]

## 2023-07-13 NOTE — OB RN PATIENT PROFILE - BP NONINVASIVE SYSTOLIC (MM HG)
[facility] [place]  [address] [facility]  Dept: [phone]  Dept Fax: [phone]  Loc: [phone]      September 23, 2017    Patient: Oliver Thompson   Date of Visit: 9/23/2017       To Whom It May Concern:    Fabiola Whitman [address]

## 2023-07-13 NOTE — OB PROVIDER H&P - ASSESSMENT
A/P:   -Admit to L&D  -Consent  -Admission labs  -NPO, except ice chips   -IV fluids  -Labor: Intact. Lorenzo q5min.   -Fetus: Cat I tracing. Continuous toco and fetal monitoring.   -GBS: Negative, no GBS ppx required   -Analgesia: Spinal  -DVT ppx: Ambulate and SCD's while in bed     Discussed with Dr. Stack   33y  at 38 weeks GA by LMP in Labor with Hx of prior  section    Admit to L&D  Vital Signs per Unit Protocol  Admission Laboratory Panel  IVF LR @ 125cc/hr  TAUS for fetal presentation and placental location  NST  Ancef on Call to OR  Bicitra 30 min prior to OR    As per Jennifer Barker MD, Endocrinology:  1) Hydrocortisone 100MG IV x1 prior to   2) Continue IV Hydrocortisone 50MG Q6 for 24 hours following  section  3) Can reduce to PO Hydrocortisone 25MG Q8 x1 day, if still in Hospital (IV if not able to tolerate PO)  4) Followed by PO Hydrocortisone 25MG Q12 x1 day, if still in Hospital  5) Resume PO Prednisone 5MG Q6 until stable    Discussed with Dr. Stack    Addendum:    Risks, benefits, and alternatives of repeat  section and any indicated procedures discussed at length, including risk of injury to adjacent organs, such as the bladder, bowel, and bilateral ureters, risk of infection, and risk of hemorrhage, which may lead to subsequent intervention and possible blood transfusion.  She has no Yarsanism or personal objection to blood transfusion, if necessary.  She was given the opportunity to ask questions and all were addressed.  She understands the plan of care.    Laureano Stack, DO   33y  at 38 weeks 2 days GA by LMP 10/18/2022 in Labor with Hx of prior  section    Admit to L&D  Vital Signs per Unit Protocol  Admission Laboratory Panel  IVF LR @ 125cc/hr  TAUS for fetal presentation and placental location  NST  Ancef on Call to OR  Bicitra 30 min prior to OR    As per Jennifer Barker MD, Endocrinology:  1) Hydrocortisone 100MG IV x1 prior to   2) Continue IV Hydrocortisone 50MG Q6 for 24 hours following  section  3) Can reduce to PO Hydrocortisone 25MG Q8 x1 day, if still in Hospital (IV if not able to tolerate PO)  4) Followed by PO Hydrocortisone 25MG Q12 x1 day, if still in Hospital  5) Resume PO Prednisone 5MG Q6 until stable    Discussed with Dr. Stack    Addendum:    Risks, benefits, and alternatives of repeat  section and any indicated procedures discussed at length, including risk of injury to adjacent organs, such as the bladder, bowel, and bilateral ureters, risk of infection, and risk of hemorrhage, which may lead to subsequent intervention and possible blood transfusion.  She has no Buddhism or personal objection to blood transfusion, if necessary.  She was given the opportunity to ask questions and all were addressed.  She understands the plan of care.    Laureano Stack, DO

## 2023-07-13 NOTE — OB PROVIDER DELIVERY SUMMARY - NSSELHIDDEN_OBGYN_ALL_OB_FT
[[email or web address]_OB_FT:[email or web address]],[NS_DeliveryAssist1_OBGYN_ALL_OB_FT:[email or web address]=],[NS_DeliveryRN_OBGYN_ALL_OB_FT:[id number]]

## 2023-07-14 ENCOUNTER — NON-APPOINTMENT (OUTPATIENT)
Age: 33
End: 2023-07-14

## 2023-07-14 LAB
BASOPHILS # BLD AUTO: 0.03 K/UL — SIGNIFICANT CHANGE UP (ref 0–0.2)
BASOPHILS NFR BLD AUTO: 0.2 % — SIGNIFICANT CHANGE UP (ref 0–2)
COVID-19 SPIKE DOMAIN AB INTERP: POSITIVE
COVID-19 SPIKE DOMAIN ANTIBODY RESULT: >250 U/ML — HIGH
EOSINOPHIL # BLD AUTO: 0 K/UL — SIGNIFICANT CHANGE UP (ref 0–0.5)
EOSINOPHIL NFR BLD AUTO: 0 % — SIGNIFICANT CHANGE UP (ref 0–6)
HCT VFR BLD CALC: 32.1 % — LOW (ref 34.5–45)
HGB BLD-MCNC: 10.5 G/DL — LOW (ref 11.5–15.5)
IMM GRANULOCYTES NFR BLD AUTO: 0.7 % — SIGNIFICANT CHANGE UP (ref 0–0.9)
LYMPHOCYTES # BLD AUTO: 1.2 K/UL — SIGNIFICANT CHANGE UP (ref 1–3.3)
LYMPHOCYTES # BLD AUTO: 6.4 % — LOW (ref 13–44)
MCHC RBC-ENTMCNC: 29.1 PG — SIGNIFICANT CHANGE UP (ref 27–34)
MCHC RBC-ENTMCNC: 32.7 GM/DL — SIGNIFICANT CHANGE UP (ref 32–36)
MCV RBC AUTO: 88.9 FL — SIGNIFICANT CHANGE UP (ref 80–100)
MONOCYTES # BLD AUTO: 0.8 K/UL — SIGNIFICANT CHANGE UP (ref 0–0.9)
MONOCYTES NFR BLD AUTO: 4.2 % — SIGNIFICANT CHANGE UP (ref 2–14)
NEUTROPHILS # BLD AUTO: 16.71 K/UL — HIGH (ref 1.8–7.4)
NEUTROPHILS NFR BLD AUTO: 88.5 % — HIGH (ref 43–77)
PLATELET # BLD AUTO: 216 K/UL — SIGNIFICANT CHANGE UP (ref 150–400)
RBC # BLD: 3.61 M/UL — LOW (ref 3.8–5.2)
RBC # FLD: 14.1 % — SIGNIFICANT CHANGE UP (ref 10.3–14.5)
SARS-COV-2 IGG+IGM SERPL QL IA: >250 U/ML — HIGH
SARS-COV-2 IGG+IGM SERPL QL IA: POSITIVE
T PALLIDUM AB TITR SER: NEGATIVE — SIGNIFICANT CHANGE UP
WBC # BLD: 18.87 K/UL — HIGH (ref 3.8–10.5)
WBC # FLD AUTO: 18.87 K/UL — HIGH (ref 3.8–10.5)

## 2023-07-14 RX ORDER — SODIUM CHLORIDE 9 MG/ML
500 INJECTION, SOLUTION INTRAVENOUS ONCE
Refills: 0 | Status: DISCONTINUED | OUTPATIENT
Start: 2023-07-14 | End: 2023-07-15

## 2023-07-14 RX ADMIN — Medication 30 MILLIGRAM(S): at 12:53

## 2023-07-14 RX ADMIN — Medication 975 MILLIGRAM(S): at 15:59

## 2023-07-14 RX ADMIN — Medication 30 MILLIGRAM(S): at 05:28

## 2023-07-14 RX ADMIN — ENOXAPARIN SODIUM 40 MILLIGRAM(S): 100 INJECTION SUBCUTANEOUS at 05:29

## 2023-07-14 RX ADMIN — SCOPALAMINE 1 PATCH: 1 PATCH, EXTENDED RELEASE TRANSDERMAL at 07:39

## 2023-07-14 RX ADMIN — Medication 25 MILLIGRAM(S): at 23:40

## 2023-07-14 RX ADMIN — Medication 50 MILLIGRAM(S): at 12:53

## 2023-07-14 RX ADMIN — Medication 975 MILLIGRAM(S): at 22:32

## 2023-07-14 RX ADMIN — Medication 50 MILLIGRAM(S): at 05:28

## 2023-07-14 RX ADMIN — Medication 30 MILLIGRAM(S): at 17:45

## 2023-07-14 RX ADMIN — Medication 975 MILLIGRAM(S): at 10:12

## 2023-07-14 RX ADMIN — SIMETHICONE 80 MILLIGRAM(S): 80 TABLET, CHEWABLE ORAL at 16:06

## 2023-07-14 RX ADMIN — Medication 50 MILLIGRAM(S): at 17:49

## 2023-07-14 NOTE — PROGRESS NOTE ADULT - ASSESSMENT
ASSESSMENT:  JOANA AGUIRRE is a 33y yo now  s/p uncomplicated repeat CS POD1 due to hx of CS and presenting in labor.  Patient is due for hydrocortisone taper due to hx of CAH. Patient has no complaints at this time, is otherwise clinically and hemodynamically stable.  [place] girl is with patient at bedside.    #CS Postpartum   - Continue routine post-operative and post-partum care  - Hgb 12.6 > 10.5  - TOV to be performed this morning  - Continue with current pain management  - Encouraged maternal- bonding  - Encouraged ambulation and use of incentive spirometer    #Congenital Adrenal Hyperplasia  -Hydrocortisone taper per endocrinologist Dr. Barker:  -POD1 IV hydrocortisone 50mg q6h for 24hr  -POD2 Can reduce to PO Hydrocortisone 25mg q8hx1 day (IV possible if can't tolerate PO)  -POD3PO Hydrocortisone 25mg q12h if still in hospital  -Resume PO prednisone 5mg for discharge    - Diet: Regular, advance as tolerated  - DVT ppx: SCDs and subQ Lovenox  - Dispo: Home POD2 per Attending's approval

## 2023-07-15 ENCOUNTER — TRANSCRIPTION ENCOUNTER (OUTPATIENT)
Age: 33
End: 2023-07-15

## 2023-07-15 VITALS
TEMPERATURE: 98 F | RESPIRATION RATE: 16 BRPM | HEART RATE: 69 BPM | SYSTOLIC BLOOD PRESSURE: 106 MMHG | DIASTOLIC BLOOD PRESSURE: 70 MMHG | OXYGEN SATURATION: 97 %

## 2023-07-15 PROCEDURE — 86900 BLOOD TYPING SEROLOGIC ABO: CPT

## 2023-07-15 PROCEDURE — 86850 RBC ANTIBODY SCREEN: CPT

## 2023-07-15 PROCEDURE — 36415 COLL VENOUS BLD VENIPUNCTURE: CPT

## 2023-07-15 PROCEDURE — 85027 COMPLETE CBC AUTOMATED: CPT

## 2023-07-15 PROCEDURE — 59050 FETAL MONITOR W/REPORT: CPT

## 2023-07-15 PROCEDURE — 86780 TREPONEMA PALLIDUM: CPT

## 2023-07-15 PROCEDURE — 85025 COMPLETE CBC W/AUTO DIFF WBC: CPT

## 2023-07-15 PROCEDURE — 86769 SARS-COV-2 COVID-19 ANTIBODY: CPT

## 2023-07-15 PROCEDURE — 86901 BLOOD TYPING SEROLOGIC RH(D): CPT

## 2023-07-15 RX ORDER — ACETAMINOPHEN 500 MG
3 TABLET ORAL
Qty: 0 | Refills: 0 | DISCHARGE
Start: 2023-07-15

## 2023-07-15 RX ORDER — IBUPROFEN 200 MG
1 TABLET ORAL
Qty: 0 | Refills: 0 | DISCHARGE
Start: 2023-07-15

## 2023-07-15 RX ORDER — IBUPROFEN 200 MG
600 TABLET ORAL EVERY 6 HOURS
Refills: 0 | Status: DISCONTINUED | OUTPATIENT
Start: 2023-07-15 | End: 2023-07-15

## 2023-07-15 RX ADMIN — Medication 600 MILLIGRAM(S): at 05:35

## 2023-07-15 RX ADMIN — ENOXAPARIN SODIUM 40 MILLIGRAM(S): 100 INJECTION SUBCUTANEOUS at 05:35

## 2023-07-15 RX ADMIN — Medication 975 MILLIGRAM(S): at 08:35

## 2023-07-15 RX ADMIN — Medication 600 MILLIGRAM(S): at 12:38

## 2023-07-15 RX ADMIN — Medication 25 MILLIGRAM(S): at 08:35

## 2023-07-15 NOTE — DISCHARGE NOTE OB - PATIENT PORTAL LINK FT
You can access the FollowMyHealth Patient Portal offered by [facility] by registering at the following website: [email or web address]. By joining [employer]’s FollowMyHealth portal, you will also be able to view your health information using other applications (apps) compatible with our system.

## 2023-07-15 NOTE — PROGRESS NOTE ADULT - ASSESSMENT
ASSESSMENT:  JOANA AGUIRRE is a 33y yo now  s/p uncomplicated repeat CS POD2 due to hx of CS and presenting in labor.  Patient is due for hydrocortisone taper due to hx of CAH. Patient has no complaints at this time, is otherwise clinically and hemodynamically stable.  [place] girl is with patient at bedside.    #CS Postpartum   - Continue routine post-operative and post-partum care  - Hgb 12.6 > 10.5  - TOV to be performed this morning  - Continue with current pain management  - Encouraged maternal- bonding  - Encouraged ambulation and use of incentive spirometer    #Congenital Adrenal Hyperplasia  -Hydrocortisone taper per endocrinologist Dr. Barker:  -POD1 IV hydrocortisone 50mg q6h for 24hr  -POD2 Can reduce to PO Hydrocortisone 25mg q8hx1 day (IV possible if can't tolerate PO)  -POD3PO Hydrocortisone 25mg q12h if still in hospital  -Resume PO prednisone 5mg for discharge    - Diet: Regular, advance as tolerated  - DVT ppx: SCDs and subQ Lovenox  - Dispo: Home today per Attending's approval        ASSESSMENT:  JOANA AGUIRRE is a 33y yo now  s/p uncomplicated repeat CS POD2 due to hx of CS and presenting in labor.  Patient is due for hydrocortisone taper due to hx of CAH. Patient has no complaints at this time, is otherwise clinically and hemodynamically stable.   girl is with patient at bedside.    #CS Postpartum   - Continue routine post-operative and post-partum care  - Hgb 12.6 > 10.5  - TOV to be performed this morning  - Continue with current pain management  - Encouraged maternal- bonding  - Encouraged ambulation and use of incentive spirometer    #Congenital Adrenal Hyperplasia  -Hydrocortisone taper per endocrinologist Dr. Barker:  -POD1 IV hydrocortisone 50mg q6h for 24hr  -POD2 Can reduce to PO Hydrocortisone 25mg q8hx1 day (IV possible if can't tolerate PO)  -POD3PO Hydrocortisone 25mg q12h if still in hospital  -Resume PO prednisone 5mg for discharge    - Diet: Regular, advance as tolerated  - DVT ppx: SCDs and subQ Lovenox  - Dispo: Home today per Attending's approval     Addendum:    Subjective Hx, Physical Exam, & Laboratory results reviewed and Pt seen and examined at bedside.  I agree with the Resident Physician's assessment and plan of care, as discussed above.  She was given the opportunity to ask questions and all were addressed.    Laureano Stack,

## 2023-07-15 NOTE — DISCHARGE NOTE OB - CARE PROVIDER_API CALL
Elma Key  Obstetrics and Gynecology  [address] [place] [address]  Phone: [phone]  Fax: [phone]  Established Patient  Follow Up Time: 2 weeks

## 2023-07-15 NOTE — PROGRESS NOTE ADULT - SUBJECTIVE AND OBJECTIVE BOX
SUBJECTIVE:  Patient seen and examined this morning at bedside. No acute events overnight. Reports feeling well this morning. Pain is well controlled with PRN pain medication.   Patient is voiding spontaneously, no BM at this time. Denies fevers, chills, shortness of breath, headaches, chest pain, vision changes or calf pain.    OBJECTIVE:  Physical exam:  General: AOx3, NAD.  Heart: S1/S2 with regular rate and normal rhythm.  Lungs: CTABL, no crackles or wheezing.   Abdomen: +BS, Soft, appropriately tender, nondistended, no guarding or rebound tenderness, firm uterine fundus at umbilicus  Incision: clean dry and intact with steristrips. No erythema or discharge.  Ext: BL LE reveal minimal swelling, no popliteal tenderness or skin changes.     Vital Signs Last 24 Hrs  Vital Signs Last 24 Hrs  T(C): 36.7 (15 Jul 2023 04:40), Max: 37.3 (14 Jul 2023 12:28)  T(F): 98 (15 Jul 2023 04:40), Max: 99.1 (14 Jul 2023 12:28)  HR: 69 (15 Jul 2023 04:40) (56 - 76)  BP: 106/70 (15 Jul 2023 04:40) (102/63 - 112/60)                              10.5   18.87 )-----------( 216      ( 14 Jul 2023 04:52 )             32.1                   07-13-23 @ 07:01  -  07-14-23 @ 06:00  --------------------------------------------------------  IN: 3937 mL / OUT: 1477 mL / NET: 2460 mL        LABS:                        10.5   18.87 )-----------( 216      ( 14 Jul 2023 04:52 )             32.1       Antibody Screen: NEG (07-13-23 @ 16:25)    
33y Female s/p c section under spinal anesthesia with duramorph for post op analgesia on 07/13/2023    Vital Signs     T(C): 36.8 (14 Jul 2023 09:05), Max: 36.9 (14 Jul 2023 00:32)  T(F): 98.3 (14 Jul 2023 09:05), Max: 98.5 (14 Jul 2023 00:32)  HR: 56 (14 Jul 2023 09:05) (56 - 82)  BP: 112/60 (14 Jul 2023 09:05) (103/59 - 127/88)  BP(mean): --  RR: 16 (14 Jul 2023 09:05) (15 - 18)  SpO2: 97% (14 Jul 2023 09:05) (93% - 98%)    Parameters below as of 14 Jul 2023 04:04    Patient On (Oxygen Delivery Method): room air            Patient's overall anesthesia satisfaction: Positive    Patients pain is well controlled     No pruritis at this time    Patient seen and doing well     No headache      No residual numbness or weakness, sensory and motor function intact    No anesthetic complications or complaints noted or reported          .            
SUBJECTIVE:  Patient seen and examined this morning at bedside. No acute events overnight. Reports feeling well this morning. Pain is well controlled with PRN pain medication.   Patient is still due for TOV, denies flatus or BM at this time. Denies fevers, chills, shortness of breath, headaches, chest pain, vision changes or calf pain.    OBJECTIVE:  Physical exam:  General: AOx3, NAD.  Heart: S1/S2 with regular rate and normal rhythm.  Lungs: CTABL, no crackles or wheezing.   Abdomen: +BS, Soft, appropriately tender, nondistended, no guarding or rebound tenderness, firm uterine fundus at umbilicus  Incision: clean dry and intact with steristrips. No erythema or discharge.  Ext: BL LE reveal minimal swelling, no popliteal tenderness or skin changes.     Vital Signs Last 24 Hrs  T(C): 36.8 (14 Jul 2023 04:04), Max: 36.9 (14 Jul 2023 00:32)  T(F): 98.2 (14 Jul 2023 04:04), Max: 98.5 (14 Jul 2023 00:32)  HR: 57 (14 Jul 2023 04:04) (57 - 82)  BP: 113/67 (14 Jul 2023 04:04) (103/59 - 127/88)    Parameters below as of 14 Jul 2023 04:04  Patient On (Oxygen Delivery Method): room air          07-13-23 @ 07:01  -  07-14-23 @ 06:00  --------------------------------------------------------  IN: 3937 mL / OUT: 1477 mL / NET: 2460 mL        LABS:                        10.5   18.87 )-----------( 216      ( 14 Jul 2023 04:52 )             32.1       Antibody Screen: NEG (07-13-23 @ 16:25)

## 2023-07-15 NOTE — DISCHARGE NOTE OB - MEDICATION SUMMARY - MEDICATIONS TO TAKE
I will START or STAY ON the medications listed below when I get home from the hospital:    ibuprofen 600 mg oral tablet  -- 1 tab(s) by mouth every 6 hours  -- Indication: For As needed for pain    acetaminophen 325 mg oral tablet  -- 3 tab(s) by mouth every 6 hours  -- Indication: For As needed for pain

## 2023-07-17 ENCOUNTER — TRANSCRIPTION ENCOUNTER (OUTPATIENT)
Age: 33
End: 2023-07-17

## 2023-07-17 ENCOUNTER — APPOINTMENT (OUTPATIENT)
Dept: OBGYN | Facility: CLINIC | Age: 33
End: 2023-07-17
Payer: COMMERCIAL

## 2023-07-17 VITALS
BODY MASS INDEX: 31.83 KG/M2 | WEIGHT: 173 LBS | SYSTOLIC BLOOD PRESSURE: 120 MMHG | HEIGHT: 62 IN | DIASTOLIC BLOOD PRESSURE: 70 MMHG

## 2023-07-17 DIAGNOSIS — O99.280 ENDOCRINE, NUTRITIONAL AND METABOLIC DISEASES COMPLICATING PREGNANCY, UNSPECIFIED TRIMESTER: ICD-10-CM

## 2023-07-17 DIAGNOSIS — E25.0 ENDOCRINE, NUTRITIONAL AND METABOLIC DISEASES COMPLICATING PREGNANCY, UNSPECIFIED TRIMESTER: ICD-10-CM

## 2023-07-17 DIAGNOSIS — O34.219 MATERNAL CARE FOR UNSPECIFIED TYPE SCAR FROM PREVIOUS CESAREAN DELIVERY: ICD-10-CM

## 2023-07-17 DIAGNOSIS — Z34.93 ENCOUNTER FOR SUPERVISION OF NORMAL PREGNANCY, UNSPECIFIED, THIRD TRIMESTER: ICD-10-CM

## 2023-07-17 DIAGNOSIS — O09.299 SUPERVISION OF PREGNANCY WITH OTHER POOR REPRODUCTIVE OR OBSTETRIC HISTORY, UNSPECIFIED TRIMESTER: ICD-10-CM

## 2023-07-17 PROBLEM — F41.9 ANXIETY DISORDER, UNSPECIFIED: Chronic | Status: ACTIVE | Noted: 2023-07-13

## 2023-07-17 PROCEDURE — 99214 OFFICE O/P EST MOD 30 MIN: CPT

## 2023-07-17 RX ORDER — NAPROXEN 500 MG/1
500 TABLET ORAL
Qty: 60 | Refills: 1 | Status: ACTIVE | COMMUNITY
Start: 2023-07-17 | End: 1900-01-01

## 2023-07-17 RX ORDER — TRAMADOL HYDROCHLORIDE 50 MG/1
50 TABLET, COATED ORAL EVERY 8 HOURS
Qty: 20 | Refills: 0 | Status: ACTIVE | COMMUNITY
Start: 2023-07-17 | End: 1900-01-01

## 2023-07-17 RX ORDER — AMOXICILLIN AND CLAVULANATE POTASSIUM 875; 125 MG/1; MG/1
875-125 TABLET, COATED ORAL
Qty: 10 | Refills: 0 | Status: ACTIVE | COMMUNITY
Start: 2023-07-17 | End: 1900-01-01

## 2023-07-17 RX ORDER — PREDNISONE 5 MG/1
5 TABLET ORAL
Qty: 90 | Refills: 3 | Status: ACTIVE | COMMUNITY
Start: 2022-10-18 | End: 1900-01-01

## 2023-07-19 PROBLEM — O34.219 PREVIOUS CESAREAN SECTION COMPLICATING PREGNANCY: Status: RESOLVED | Noted: 2022-12-21 | Resolved: 2023-07-19

## 2023-07-19 PROBLEM — O09.299 HISTORY OF MACROSOMIA IN INFANT IN PRIOR PREGNANCY, CURRENTLY PREGNANT: Status: RESOLVED | Noted: 2023-06-19 | Resolved: 2023-07-19

## 2023-07-19 PROBLEM — Z34.93 THIRD TRIMESTER PREGNANCY: Status: RESOLVED | Noted: 2023-06-16 | Resolved: 2023-07-19

## 2023-07-19 PROBLEM — O99.280: Status: RESOLVED | Noted: 2022-12-21 | Resolved: 2023-07-19

## 2023-07-19 NOTE — HISTORY OF PRESENT ILLNESS
[FreeTextEntry1] : 33-year-old female G2, P2 presenting office for a postoperative concern regarding her Pfannenstiel incision and discomfort/pain status post repeat low transverse  section on 2023 at 38 weeks and 2 days delivering a baby girl weighing 3270 g, 7 pounds 3 ounces, Apgars 9 and 9.  Pregnancy was complicated by congenital adrenal hyperplasia, history of prior  section, anxiety in pregnancy, large for gestational age.\par \par Additional obstetrical history 1 prior delivery 9 years ago, via  section due to nonreassuring fetal heart tracing.  Gynecologic history significant for an ovarian cyst not requiring surgical intervention, but she was on an oral contraceptive for several years.  She also reports a history of HSV; she is unsure if it is type I or II.  Medical history significant for congenital adrenal hyperplasia she is currently on prednisone 5 mg daily and followed closely via endocrinology.  Denies additional surgical history.  Family history of her mother having breast cancer twice; she has undergone genetic carrier screening and tested negative.  She did test at risk for colon cancer and then underwent a colonoscopy and was directed to return in 5 years time.  Denies alcohol, tobacco, illicit drug use.  Denies allergies to medications.

## 2023-07-19 NOTE — PLAN
[FreeTextEntry1] : \par Subjective history and physical examination with a small area of possible early Pfannenstiel incision infection.  Patient was directed on wound care and will be started on Augmentin 875 twice daily for 5 days time.  Patient was also directed on optimal pain management and will take naproxen 500 mg every 12 hours and use tramadol as needed.  She is given opportunity ask questions and all questions were addressed.  She was given call, follow-up, ER precautions as well.  She return to office in 1 week's time for assessment of her Pfannenstiel incision.

## 2023-07-19 NOTE — PHYSICAL EXAM
[Appropriately responsive] : appropriately responsive [No Acute Distress] : no acute distress [Alert] : alert [Soft] : soft [Non-distended] : non-distended [No HSM] : No HSM [No Mass] : no mass [Oriented x3] : oriented x3 [FreeTextEntry7] : Pfannenstiel incision with a small area of early infection

## 2023-07-20 ENCOUNTER — APPOINTMENT (OUTPATIENT)
Dept: OBGYN | Facility: HOSPITAL | Age: 33
End: 2023-07-20

## 2023-07-26 ENCOUNTER — APPOINTMENT (OUTPATIENT)
Dept: OBGYN | Facility: CLINIC | Age: 33
End: 2023-07-26
Payer: COMMERCIAL

## 2023-07-26 VITALS
WEIGHT: 165 LBS | BODY MASS INDEX: 30.36 KG/M2 | HEART RATE: 72 BPM | SYSTOLIC BLOOD PRESSURE: 132 MMHG | DIASTOLIC BLOOD PRESSURE: 81 MMHG | HEIGHT: 62 IN

## 2023-07-26 PROCEDURE — 99214 OFFICE O/P EST MOD 30 MIN: CPT

## 2023-07-26 RX ORDER — MISOPROSTOL 200 UG/1
200 TABLET ORAL
Qty: 18 | Refills: 0 | Status: ACTIVE | COMMUNITY
Start: 2023-07-26 | End: 1900-01-01

## 2023-07-26 NOTE — REASON FOR VISIT
[Post-Op Visit] : a post-op visit for [FreeTextEntry2] : Follow-up for postoperative wound infection

## 2023-07-26 NOTE — PLAN
[FreeTextEntry1] : \par Postoperative wound infection has healed and has no further signs of inflammation or infection.  Patient's pain is well controlled without medication.  Discussed postpartum vaginal bleeding and patient has no current signs or symptoms of acute or chronic anemia.  Risk-benefit alternatives were discussed and patient was written for misoprostol 600 mg to be taken every 12 hours for 3 days time, if needed.  She will monitor at this time.  She is currently breast-feeding.  She was offered contraception, and declined, but understands it may be made available upon request.  Patient return to office in 4 weeks time for her postpartum appointment.  All questions addressed.

## 2023-07-26 NOTE — HISTORY OF PRESENT ILLNESS
[FreeTextEntry1] : 34-year-old female G2, P2 presenting office for follow-up status post diagnosis of postoperative wound infection.  Patient was seen in office on 2023 and started on Augmentin and was given medications for postoperative pain management.  Patient is overall improved and no longer using medication for pain management.  She reports no further signs or symptoms of inflammation or infection at the Pfannenstiel incision site.  She does report episodes of vaginal bleeding, which she is concerned with.  She is currently breast-feeding.  She has no signs or symptoms of acute anemia at this time.\par \par She is status post repeat low transverse  section on 2023 at 38 weeks and 2 days delivering a baby girl weighing 3270 g, 7 pounds 3 ounces, Apgars 9 and 9.  Pregnancy was complicated by congenital adrenal hyperplasia, history of prior  section, anxiety in pregnancy, large for gestational age.\par \par Additional obstetrical history 1 prior delivery 9 years ago, via  section due to nonreassuring fetal heart tracing.  Gynecologic history significant for an ovarian cyst not requiring surgical intervention, but she was on an oral contraceptive for several years.  She also reports a history of HSV; she is unsure if it is type I or II.  Medical history significant for congenital adrenal hyperplasia she is currently on prednisone 5 mg daily and followed closely via endocrinology.  Denies additional surgical history.  Family history of her mother having breast cancer twice; she has undergone genetic carrier screening and tested negative.  She did test at risk for colon cancer and then underwent a colonoscopy and was directed to return in 5 years time.  Denies alcohol, tobacco, illicit drug use.  Denies allergies to medications.

## 2023-07-26 NOTE — COUNSELING
[Nutrition/ Exercise/ Weight Management] : nutrition, exercise, weight management [Vitamins/Supplements] : vitamins/supplements [Contraception/ Emergency Contraception/ Safe Sexual Practices] : contraception, emergency contraception, safe sexual practices [Confidentiality] : confidentiality [Medication Management] : medication management [Pre/Post Op Instructions] : pre/post op instructions

## 2023-07-26 NOTE — PHYSICAL EXAM
[Appropriately responsive] : appropriately responsive [Alert] : alert [No Acute Distress] : no acute distress [Soft] : soft [Non-distended] : non-distended [No HSM] : No HSM [No Mass] : no mass [Oriented x3] : oriented x3 [FreeTextEntry7] : Pfannenstiel incision site healing well

## 2023-08-23 ENCOUNTER — APPOINTMENT (OUTPATIENT)
Dept: OBGYN | Facility: CLINIC | Age: 33
End: 2023-08-23
Payer: COMMERCIAL

## 2023-08-23 VITALS
HEIGHT: 62 IN | SYSTOLIC BLOOD PRESSURE: 127 MMHG | WEIGHT: 165 LBS | DIASTOLIC BLOOD PRESSURE: 80 MMHG | BODY MASS INDEX: 30.36 KG/M2

## 2023-08-23 DIAGNOSIS — Z87.59 PERSONAL HISTORY OF OTHER COMPLICATIONS OF PREGNANCY, CHILDBIRTH AND THE PUERPERIUM: ICD-10-CM

## 2023-08-23 DIAGNOSIS — Z86.19 PERSONAL HISTORY OF OTHER INFECTIOUS AND PARASITIC DISEASES: ICD-10-CM

## 2023-08-23 DIAGNOSIS — G89.18 OTHER ACUTE POSTPROCEDURAL PAIN: ICD-10-CM

## 2023-08-23 DIAGNOSIS — Z09 ENCOUNTER FOR FOLLOW-UP EXAMINATION AFTER COMPLETED TREATMENT FOR CONDITIONS OTHER THAN MALIGNANT NEOPLASM: ICD-10-CM

## 2023-08-23 PROCEDURE — 0503F POSTPARTUM CARE VISIT: CPT

## 2023-08-23 NOTE — PLAN
[FreeTextEntry1] : Patient is doing well in the postpartum period and was once again counseled on expectations regarding the return of her menstrual period while breast-feeding.  Response alternatives of contraceptive options discussed and patient declined at this time, but does understand they may be made available upon her request.  She will return to office in 3 months time for annual well woman appointment.  She is given option ask questions all questions were addressed.

## 2023-08-23 NOTE — HISTORY OF PRESENT ILLNESS
[FreeTextEntry1] : 34-year-old female  presenting office for her postpartum appointment.  Patient has no further signs or symptoms of postoperative wound infection and the incision has healed well.  She is currently breast-feeding intermenstrual.  Has not returned.  She is status post repeat low transverse  section on 2023 at 38 weeks and 2 days delivering a baby girl weighing 3270 g, 7 pounds 3 ounces, Apgars 9 and 9.  Pregnancy was complicated by congenital adrenal hyperplasia, history of prior  section, anxiety in pregnancy, large for gestational age.  Additional obstetrical history 1 prior delivery 9 years ago, via  section due to nonreassuring fetal heart tracing.  Gynecologic history significant for an ovarian cyst not requiring surgical intervention, but she was on an oral contraceptive for several years.  She also reports a history of HSV; she is unsure if it is type I or II.  Medical history significant for congenital adrenal hyperplasia she is currently on prednisone 5 mg daily and followed closely via endocrinology.  Denies additional surgical history.  Family history of her mother having breast cancer twice; she has undergone genetic carrier screening and tested negative.  She did test at risk for colon cancer and then underwent a colonoscopy and was directed to return in 5 years time.  Denies alcohol, tobacco, illicit drug use.  Denies allergies to medications.

## 2023-11-20 ENCOUNTER — APPOINTMENT (OUTPATIENT)
Dept: OBGYN | Facility: CLINIC | Age: 33
End: 2023-11-20
Payer: COMMERCIAL

## 2023-11-20 VITALS
DIASTOLIC BLOOD PRESSURE: 80 MMHG | WEIGHT: 163 LBS | BODY MASS INDEX: 30 KG/M2 | HEIGHT: 62 IN | SYSTOLIC BLOOD PRESSURE: 131 MMHG

## 2023-11-20 DIAGNOSIS — Z01.419 ENCOUNTER FOR GYNECOLOGICAL EXAMINATION (GENERAL) (ROUTINE) W/OUT ABNORMAL FINDINGS: ICD-10-CM

## 2023-11-20 DIAGNOSIS — Z98.891 HISTORY OF UTERINE SCAR FROM PREVIOUS SURGERY: ICD-10-CM

## 2023-11-20 PROCEDURE — 99395 PREV VISIT EST AGE 18-39: CPT

## 2023-11-22 LAB — HPV HIGH+LOW RISK DNA PNL CVX: NOT DETECTED

## 2023-11-27 LAB
ALBUMIN SERPL ELPH-MCNC: 4.5 G/DL
ALP BLD-CCNC: 79 U/L
ALT SERPL-CCNC: 16 U/L
ANION GAP SERPL CALC-SCNC: 15 MMOL/L
AST SERPL-CCNC: 14 U/L
BASOPHILS # BLD AUTO: 0.03 K/UL
BASOPHILS NFR BLD AUTO: 0.3 %
BILIRUB SERPL-MCNC: 0.3 MG/DL
BUN SERPL-MCNC: 11 MG/DL
CALCIUM SERPL-MCNC: 9.6 MG/DL
CHLORIDE SERPL-SCNC: 105 MMOL/L
CHOLEST SERPL-MCNC: 181 MG/DL
CO2 SERPL-SCNC: 19 MMOL/L
CREAT SERPL-MCNC: 0.65 MG/DL
CYTOLOGY CVX/VAG DOC THIN PREP: NORMAL
EGFR: 120 ML/MIN/1.73M2
EOSINOPHIL # BLD AUTO: 0.12 K/UL
EOSINOPHIL NFR BLD AUTO: 1.3 %
ESTIMATED AVERAGE GLUCOSE: 103 MG/DL
GLUCOSE SERPL-MCNC: 100 MG/DL
HBA1C MFR BLD HPLC: 5.2 %
HCT VFR BLD CALC: 42 %
HDLC SERPL-MCNC: 76 MG/DL
HGB BLD-MCNC: 13.4 G/DL
IMM GRANULOCYTES NFR BLD AUTO: 0.3 %
LDLC SERPL CALC-MCNC: 95 MG/DL
LYMPHOCYTES # BLD AUTO: 2.51 K/UL
LYMPHOCYTES NFR BLD AUTO: 27.5 %
MAN DIFF?: NORMAL
MCHC RBC-ENTMCNC: 28.2 PG
MCHC RBC-ENTMCNC: 31.9 GM/DL
MCV RBC AUTO: 88.2 FL
MONOCYTES # BLD AUTO: 0.6 K/UL
MONOCYTES NFR BLD AUTO: 6.6 %
NEUTROPHILS # BLD AUTO: 5.85 K/UL
NEUTROPHILS NFR BLD AUTO: 64 %
NONHDLC SERPL-MCNC: 104 MG/DL
PLATELET # BLD AUTO: 307 K/UL
POTASSIUM SERPL-SCNC: 4.3 MMOL/L
PROT SERPL-MCNC: 7.2 G/DL
RBC # BLD: 4.76 M/UL
RBC # FLD: 13.7 %
SODIUM SERPL-SCNC: 140 MMOL/L
TRIGL SERPL-MCNC: 47 MG/DL
TSH SERPL-ACNC: 1.94 UIU/ML
WBC # FLD AUTO: 9.14 K/UL

## 2024-05-14 ENCOUNTER — NON-APPOINTMENT (OUTPATIENT)
Age: 34
End: 2024-05-14

## 2024-05-28 ENCOUNTER — APPOINTMENT (OUTPATIENT)
Dept: ENDOCRINOLOGY | Facility: CLINIC | Age: 34
End: 2024-05-28
Payer: COMMERCIAL

## 2024-05-28 VITALS
WEIGHT: 149 LBS | BODY MASS INDEX: 27.42 KG/M2 | HEART RATE: 78 BPM | SYSTOLIC BLOOD PRESSURE: 106 MMHG | DIASTOLIC BLOOD PRESSURE: 64 MMHG | HEIGHT: 62 IN | OXYGEN SATURATION: 98 %

## 2024-05-28 DIAGNOSIS — Z79.52 LONG TERM (CURRENT) USE OF SYSTEMIC STEROIDS: ICD-10-CM

## 2024-05-28 DIAGNOSIS — E25.0 CONGENITAL ADRENOGENITAL DISORDERS ASSOCIATED WITH ENZYME DEFICIENCY: ICD-10-CM

## 2024-05-28 LAB
25(OH)D3 SERPL-MCNC: 43.1 NG/ML
ALBUMIN SERPL ELPH-MCNC: 4.8 G/DL
ALP BLD-CCNC: 82 U/L
ALT SERPL-CCNC: 15 U/L
ANION GAP SERPL CALC-SCNC: 14 MMOL/L
AST SERPL-CCNC: 16 U/L
BILIRUB SERPL-MCNC: 0.3 MG/DL
BUN SERPL-MCNC: 7 MG/DL
CALCIUM SERPL-MCNC: 10 MG/DL
CHLORIDE SERPL-SCNC: 102 MMOL/L
CO2 SERPL-SCNC: 23 MMOL/L
CREAT SERPL-MCNC: 0.76 MG/DL
EGFR: 107 ML/MIN/1.73M2
GLUCOSE SERPL-MCNC: 94 MG/DL
POTASSIUM SERPL-SCNC: 4.3 MMOL/L
PROT SERPL-MCNC: 7.8 G/DL
SODIUM SERPL-SCNC: 140 MMOL/L
TSH SERPL-ACNC: 1.74 UIU/ML

## 2024-05-28 PROCEDURE — 99215 OFFICE O/P EST HI 40 MIN: CPT

## 2024-05-28 PROCEDURE — G2211 COMPLEX E/M VISIT ADD ON: CPT | Mod: NC,1L

## 2024-05-29 NOTE — HISTORY OF PRESENT ILLNESS
[FreeTextEntry1] : 34 yo F with non-classic CAH and prior hyperprolactinemia here for follow up.  S/p  of daughter in 2023, reports she is healthy. Was on steroid plan during this delivery and states she did feel better (did not have steroid plan during her first pregnancy). May consider more pregnancies in the future ( or ).  She is still breastfeeding and wants to continue for another month until 1 year tip.   negative for CAH genetic testing  On prednisone 5 mg daily. Denies any recent illnesses that required sick dose of steroids. She had COVID 5 months ago but did not require sick dose. --  CAH was diagnosed at age 5 years old. Patient started to have hair growth, breast development came early, growth was fast. At that time, she had signs of androgenization. She was on a "daily medication" until 9 or 10 years old and then she came off of her medication. Patient was evaluated by an endocrine doctor but was a loss to follow up.  Menarche at 10-12 year old, was on birth control from 17 years old until 20 years for a diagnosis of ovarian cysts. Patient came off of birth control pill due to chronic yeast/bacterial infections so she just stopped the OCP. Then one year after stopping OCP she got pregnant. No history of vaginoplasty, no ambiguous genitalia history.  Tried OCPs in past, several times, but had infections. IUD x 2 got imbedded. Currently utilizing condoms. She attempted birth control again but developed infections so declines further birth control pills Patient had lost 12 pounds with diet and exercise. She has cramps and bloating prior to period  she did IVF and got pregnant with twins in , unfortunately she miscarried at week 14. She was on steroids until one month into pregnancy. Has an 8 year old son, was not on steroids for that pregnancy nor before.  currently on prednisone 5 mg QHS.   Symptoms: Bothersome facial hair growth (feels she is shaving more frequently), facial acne is stable. Reports low energy, low sex drive, fatigue for at least 6 months. Notes it could also be post-partum symptoms. Menses resumed 2 months after recent delivery, have been coming monthly. She is trying to lose weight slowly. Notes she is bruising more easily. Denies fractures.   Had breast milk discharge in , none since. No nipple stimulation. No visual field cuts. Not on any anti-psychotic agents, just Xanax as needed for anxiety and depression. No new meds. She has a hx of hyperPRl s/p pregnancy of her son 7 years ago. She only  him for 2-3 months but continued to make milk.Last level was normal.  Pituitary MRI 2019 did NOT reveal pituitary pathology, no pituitary adenoma identified  2020 labs revealed negative salivary cortisol and normal prolactin levels, DHEAS 524, improved androstenedione levels and 17 OHP in normative ranges.  Sister also with NCAH and pituitary cyst.  Supplements: prenatals, vit D 1000 IU qd

## 2024-05-29 NOTE — ASSESSMENT
[FreeTextEntry1] : 34 yo F with non-classic CAH and prior hyperprolactinemia here for follow up. s/p  23-  had steroid plan implemented and she felt good.  Currently still breastfeeding. Discussed that corticosteroids are ok in breastfeeding at low doses, prednisone is one of the oral corticosteroids preferred for use in breastfeeding women. Can try to space feeds if able to 2-4 hrs after maternal dose of prednisone is taken to reduce potential exposure to the child.  1. NCCAH - Her main symptoms have been acne and hirsutism. Previously discussed options including steroids, spironolactone and OCP. Pt has had bad experience with OCP and prefers to not be on one.  May be considering another pregnancy in the future. Discussed that glucocorticoids are indicated for women with NCCAH planning for pregnancy, it is a safe glucocorticoid (as opposed to dex which can cross placenta and cause fetal HPA axis suppression). during pregnancy, no need for lab monitoring as 17OHP and androgens go up in pregnancy, just follow clinically. Guidelines on glucocorticoid tx in NCCAH are vague - can continue low dose prednisone if used pre-conception whereas some clinicians will stop steroid once pt is pregnant. Pt has been on chronic steroids thus continued pred 5 mg daily, has not needed increased steroid dosing at end of pregnancy. Will need stress dose hydrocortisone for labor and delivery. -continue prednisone 5 mg daily -recommend bone density for baseline given chronic steroid use --> schedule with next visit -adrenal imaging in 2019 revealed no abnormal adrenal pathology -pt prefers to continue chronic GC treatment for now, felt unwell off of it, however she is considering potentially weaning down on steroids if possible in the future. Likely will need to test HPA axis before that. Discussed risks of osteoporosis and tertiary adrenal insufficiency/steroid dependence. -will monitor labs today  2. Hyperprolactinemia -repeat prolactin and macroprolactin levels as needed (after finishing breastfeeding) -no pituitary adenoma on imaging; surveillance imaging if prolactin levels increase  3. Vitamin D deficiency -She is taking vit D 1000 IU daily. -Can check vit D level.  RTC 4-5 months with Dr. Barker.  Radha nAgel "Dhruv Figueroa NP

## 2024-05-30 LAB
TESTOST FREE SERPL-MCNC: 4.8 PG/ML
TESTOST SERPL-MCNC: 43 NG/DL

## 2024-05-31 LAB — 17OHP SERPL-MCNC: 250 NG/DL

## 2024-06-02 LAB
ANDROST SERPL-MCNC: 310 NG/DL
SHBG-ESOTERIX: 74.4 NMOL/L

## 2024-06-04 LAB — DHEA-SULFATE, SERUM: 205 UG/DL

## 2024-07-17 ENCOUNTER — RX RENEWAL (OUTPATIENT)
Age: 34
End: 2024-07-17

## 2024-08-22 ENCOUNTER — NON-APPOINTMENT (OUTPATIENT)
Age: 34
End: 2024-08-22

## 2024-09-03 NOTE — END OF VISIT
Physical Therapy    Patient not seen in therapy.     Unavailable due to medical tests/procedures (Nuclear medicine).      Re-attempt plan: per established plan of care or later today       OBJECTIVE                         Therapy procedure time and total treatment time can be found documented on the Time Entry flowsheet   [Time Spent: ___ minutes] : I have spent [unfilled] minutes of time on the encounter.

## 2024-09-26 ENCOUNTER — APPOINTMENT (OUTPATIENT)
Dept: OBGYN | Facility: CLINIC | Age: 34
End: 2024-09-26
Payer: COMMERCIAL

## 2024-09-26 VITALS
DIASTOLIC BLOOD PRESSURE: 88 MMHG | SYSTOLIC BLOOD PRESSURE: 138 MMHG | HEIGHT: 62 IN | WEIGHT: 152 LBS | BODY MASS INDEX: 27.97 KG/M2

## 2024-09-26 DIAGNOSIS — N89.8 OTHER SPECIFIED NONINFLAMMATORY DISORDERS OF VAGINA: ICD-10-CM

## 2024-09-26 PROCEDURE — 99214 OFFICE O/P EST MOD 30 MIN: CPT

## 2024-09-26 RX ORDER — FLUCONAZOLE 150 MG/1
150 TABLET ORAL
Qty: 2 | Refills: 4 | Status: ACTIVE | COMMUNITY
Start: 2024-09-26 | End: 1900-01-01

## 2024-09-26 NOTE — HISTORY OF PRESENT ILLNESS
[FreeTextEntry1] : 33 yo here for a vaginal itch she was on abx for pneumonia [Currently Active] : currently active

## 2024-09-26 NOTE — PHYSICAL EXAM
[Chaperone Declined] : Patient declined chaperone [Appropriately responsive] : appropriately responsive [Alert] : alert [No Acute Distress] : no acute distress [Soft] : soft [No Lesions] : no lesions [Oriented x3] : oriented x3 [Labia Majora] : normal [Labia Minora] : normal [Normal] : normal [Uterine Adnexae] : normal

## 2024-09-26 NOTE — DISCUSSION/SUMMARY
[FreeTextEntry1] : Discussed with pt dove soap, Detergents and not changing soaps or detergents, Antibiotics, pad or tampon changes. Encouraged to take probiotics Azo,  ,Agusto or kevnaomi,  Rx sent to pharmacy.

## 2024-10-31 ENCOUNTER — APPOINTMENT (OUTPATIENT)
Dept: ENDOCRINOLOGY | Facility: CLINIC | Age: 34
End: 2024-10-31
Payer: COMMERCIAL

## 2024-10-31 DIAGNOSIS — Z86.39 PERSONAL HISTORY OF OTHER ENDOCRINE, NUTRITIONAL AND METABOLIC DISEASE: ICD-10-CM

## 2024-10-31 DIAGNOSIS — Z79.52 LONG TERM (CURRENT) USE OF SYSTEMIC STEROIDS: ICD-10-CM

## 2024-10-31 DIAGNOSIS — E25.0 CONGENITAL ADRENOGENITAL DISORDERS ASSOCIATED WITH ENZYME DEFICIENCY: ICD-10-CM

## 2024-10-31 PROCEDURE — G2211 COMPLEX E/M VISIT ADD ON: CPT | Mod: NC

## 2024-10-31 PROCEDURE — 99214 OFFICE O/P EST MOD 30 MIN: CPT

## 2024-11-07 ENCOUNTER — APPOINTMENT (OUTPATIENT)
Dept: ENDOCRINOLOGY | Facility: CLINIC | Age: 34
End: 2024-11-07

## 2024-11-07 ENCOUNTER — TRANSCRIPTION ENCOUNTER (OUTPATIENT)
Age: 34
End: 2024-11-07

## 2024-11-07 LAB
25(OH)D3 SERPL-MCNC: 48.5 NG/ML
ALBUMIN SERPL ELPH-MCNC: 4.4 G/DL
ALP BLD-CCNC: 55 U/L
ALT SERPL-CCNC: 7 U/L
ANION GAP SERPL CALC-SCNC: 14 MMOL/L
AST SERPL-CCNC: 11 U/L
BILIRUB SERPL-MCNC: 0.3 MG/DL
BUN SERPL-MCNC: 8 MG/DL
CALCIUM SERPL-MCNC: 9.7 MG/DL
CHLORIDE SERPL-SCNC: 102 MMOL/L
CO2 SERPL-SCNC: 22 MMOL/L
CREAT SERPL-MCNC: 0.72 MG/DL
EGFR: 113 ML/MIN/1.73M2
ESTIMATED AVERAGE GLUCOSE: 108 MG/DL
GLUCOSE SERPL-MCNC: 97 MG/DL
HBA1C MFR BLD HPLC: 5.4 %
POTASSIUM SERPL-SCNC: 3.6 MMOL/L
PROT SERPL-MCNC: 7.2 G/DL
SODIUM SERPL-SCNC: 138 MMOL/L
TESTOST FREE SERPL-MCNC: 4.1 PG/ML
TESTOST SERPL-MCNC: 57.3 NG/DL

## 2024-11-09 LAB
17OHP SERPL-MCNC: 544 NG/DL
ANDROST SERPL-MCNC: 323 NG/DL

## 2024-11-18 ENCOUNTER — APPOINTMENT (OUTPATIENT)
Dept: ENDOCRINOLOGY | Facility: CLINIC | Age: 34
End: 2024-11-18

## 2024-11-18 ENCOUNTER — APPOINTMENT (OUTPATIENT)
Dept: ENDOCRINOLOGY | Facility: CLINIC | Age: 34
End: 2024-11-18
Payer: COMMERCIAL

## 2024-11-18 DIAGNOSIS — Z79.52 LONG TERM (CURRENT) USE OF SYSTEMIC STEROIDS: ICD-10-CM

## 2024-11-18 PROCEDURE — 77085 DXA BONE DENSITY AXL VRT FX: CPT

## 2024-11-22 ENCOUNTER — NON-APPOINTMENT (OUTPATIENT)
Age: 34
End: 2024-11-22

## 2024-11-26 PROBLEM — Z79.52 CURRENT CHRONIC USE OF SYSTEMIC STEROIDS: Status: ACTIVE | Noted: 2024-11-26

## 2025-01-09 ENCOUNTER — TRANSCRIPTION ENCOUNTER (OUTPATIENT)
Age: 35
End: 2025-01-09

## 2025-01-09 RX ORDER — HYDROCORTISONE 5 MG/1
5 TABLET ORAL
Qty: 270 | Refills: 1 | Status: ACTIVE | COMMUNITY
Start: 2025-01-09 | End: 1900-01-01

## 2025-02-10 ENCOUNTER — TRANSCRIPTION ENCOUNTER (OUTPATIENT)
Age: 35
End: 2025-02-10

## 2025-04-15 ENCOUNTER — APPOINTMENT (OUTPATIENT)
Dept: OBGYN | Facility: CLINIC | Age: 35
End: 2025-04-15
Payer: COMMERCIAL

## 2025-04-15 VITALS
WEIGHT: 143 LBS | DIASTOLIC BLOOD PRESSURE: 82 MMHG | SYSTOLIC BLOOD PRESSURE: 140 MMHG | HEIGHT: 62 IN | BODY MASS INDEX: 26.31 KG/M2

## 2025-04-15 DIAGNOSIS — N92.6 IRREGULAR MENSTRUATION, UNSPECIFIED: ICD-10-CM

## 2025-04-15 PROCEDURE — 81025 URINE PREGNANCY TEST: CPT

## 2025-04-15 PROCEDURE — 99214 OFFICE O/P EST MOD 30 MIN: CPT

## 2025-04-17 LAB — HCG UR QL: NEGATIVE

## 2025-08-25 ENCOUNTER — RX RENEWAL (OUTPATIENT)
Age: 35
End: 2025-08-25